# Patient Record
Sex: MALE | Race: WHITE | NOT HISPANIC OR LATINO | Employment: OTHER | URBAN - METROPOLITAN AREA
[De-identification: names, ages, dates, MRNs, and addresses within clinical notes are randomized per-mention and may not be internally consistent; named-entity substitution may affect disease eponyms.]

---

## 2018-06-28 NOTE — PROCEDURE NOTE: SURGICAL
Prior to commencing surgery patient identification, surgical procedure, site, and side were confirmed by Dr. Tawana Neville. Following topical proparacaine anesthesia, the patient was positioned at the YAG laser, a contact lens coupled to the cornea with methylcellulose and an axial posterior capsulotomy performed without complication using 2.9 Mj x 18. Excess methylcellulose was washed from the eye, one drop of Alphagan was instilled and the patient returned to the holding area having tolerated the procedure well and without complication. <br />[id number]

## 2019-06-05 ENCOUNTER — NEW PATIENT COMPREHENSIVE (OUTPATIENT)
Dept: URBAN - METROPOLITAN AREA CLINIC 36 | Facility: CLINIC | Age: 74
End: 2019-06-05

## 2019-06-05 DIAGNOSIS — H52.7: ICD-10-CM

## 2019-06-05 DIAGNOSIS — H33.302: ICD-10-CM

## 2019-06-05 DIAGNOSIS — E11.9: ICD-10-CM

## 2019-06-05 DIAGNOSIS — H34.8320: ICD-10-CM

## 2019-06-05 DIAGNOSIS — H40.1134: ICD-10-CM

## 2019-06-05 PROCEDURE — 92004 COMPRE OPH EXAM NEW PT 1/>: CPT

## 2019-06-05 PROCEDURE — 92015 DETERMINE REFRACTIVE STATE: CPT

## 2019-06-05 ASSESSMENT — VISUAL ACUITY
OD_PH: 20/20
OS_CC: J1
OS_SC: 20/100-1
OS_SC: <J10
OD_SC: <J2
OD_CC: J1+
OD_SC: 20/40+1

## 2019-06-05 ASSESSMENT — TONOMETRY
OD_IOP_MMHG: 13
OS_IOP_MMHG: 13

## 2019-06-12 ENCOUNTER — NEW PATIENT COMPREHENSIVE (OUTPATIENT)
Dept: URBAN - METROPOLITAN AREA CLINIC 36 | Facility: CLINIC | Age: 74
End: 2019-06-12

## 2019-06-12 DIAGNOSIS — H44.19: ICD-10-CM

## 2019-06-12 DIAGNOSIS — E11.9: ICD-10-CM

## 2019-06-12 DIAGNOSIS — H33.301: ICD-10-CM

## 2019-06-12 DIAGNOSIS — H34.8320: ICD-10-CM

## 2019-06-12 PROCEDURE — 92014 COMPRE OPH EXAM EST PT 1/>: CPT

## 2019-06-12 PROCEDURE — 92250 FUNDUS PHOTOGRAPHY W/I&R: CPT

## 2019-06-12 PROCEDURE — 9222550 BILAT EXTENDED OPHTHALMOSCOPY, FIRST

## 2019-06-12 PROCEDURE — 67028 INJECTION EYE DRUG: CPT

## 2019-06-12 PROCEDURE — 92235 FLUORESCEIN ANGRPH MLTIFRAME: CPT

## 2019-06-12 ASSESSMENT — TONOMETRY
OD_IOP_MMHG: 13
OS_IOP_MMHG: 13

## 2019-06-12 ASSESSMENT — VISUAL ACUITY
OD_SC: 20/25+1
OS_SC: 20/100

## 2019-07-24 ENCOUNTER — ESTABLISHED PATIENT (OUTPATIENT)
Dept: URBAN - METROPOLITAN AREA CLINIC 36 | Facility: CLINIC | Age: 74
End: 2019-07-24

## 2019-07-24 DIAGNOSIS — H40.1134: ICD-10-CM

## 2019-07-24 DIAGNOSIS — H34.8320: ICD-10-CM

## 2019-07-24 PROCEDURE — 92250 FUNDUS PHOTOGRAPHY W/I&R: CPT

## 2019-07-24 PROCEDURE — 67028 INJECTION EYE DRUG: CPT

## 2019-07-24 PROCEDURE — 92134 CPTRZ OPH DX IMG PST SGM RTA: CPT

## 2019-07-24 PROCEDURE — 92012 INTRM OPH EXAM EST PATIENT: CPT

## 2019-07-24 ASSESSMENT — VISUAL ACUITY
OS_SC: 20/200
OD_SC: 20/25+2

## 2019-07-24 ASSESSMENT — TONOMETRY
OS_IOP_MMHG: 12
OD_IOP_MMHG: 13

## 2019-07-26 ENCOUNTER — GLAUCOMA EVAL (OUTPATIENT)
Dept: URBAN - METROPOLITAN AREA CLINIC 36 | Facility: CLINIC | Age: 74
End: 2019-07-26

## 2019-07-26 VITALS
SYSTOLIC BLOOD PRESSURE: 155 MMHG | HEART RATE: 75 BPM | DIASTOLIC BLOOD PRESSURE: 99 MMHG | HEIGHT: 60 IN | RESPIRATION RATE: 16 BRPM

## 2019-07-26 DIAGNOSIS — H40.1134: ICD-10-CM

## 2019-07-26 PROCEDURE — 92004 COMPRE OPH EXAM NEW PT 1/>: CPT

## 2019-07-26 PROCEDURE — 76514 ECHO EXAM OF EYE THICKNESS: CPT

## 2019-07-26 PROCEDURE — 92250 FUNDUS PHOTOGRAPHY W/I&R: CPT

## 2019-07-26 PROCEDURE — 9222550 BILAT EXTENDED OPHTHALMOSCOPY, FIRST

## 2019-07-26 ASSESSMENT — VISUAL ACUITY
OS_SC: >J12
OD_SC: 20/20
OD_SC: J12
OS_SC: 20/100

## 2019-07-26 ASSESSMENT — PACHYMETRY
OD_CT_UM: 504
OS_CT_UM: 524

## 2019-07-26 ASSESSMENT — TONOMETRY
OD_IOP_MMHG: 12
OS_IOP_MMHG: 10

## 2019-08-21 ENCOUNTER — ESTABLISHED PATIENT (OUTPATIENT)
Dept: URBAN - METROPOLITAN AREA CLINIC 36 | Facility: CLINIC | Age: 74
End: 2019-08-21

## 2019-08-21 DIAGNOSIS — H35.30: ICD-10-CM

## 2019-08-21 DIAGNOSIS — H34.8320: ICD-10-CM

## 2019-08-21 DIAGNOSIS — H33.301: ICD-10-CM

## 2019-08-21 DIAGNOSIS — H43.813: ICD-10-CM

## 2019-08-21 DIAGNOSIS — E11.9: ICD-10-CM

## 2019-08-21 PROCEDURE — 92250 FUNDUS PHOTOGRAPHY W/I&R: CPT

## 2019-08-21 PROCEDURE — 92012 INTRM OPH EXAM EST PATIENT: CPT

## 2019-08-21 PROCEDURE — 9222650 BILAT EXTENDED OPHTHALMOSCOPY, F/U

## 2019-08-21 PROCEDURE — 92235 FLUORESCEIN ANGRPH MLTIFRAME: CPT

## 2019-08-21 PROCEDURE — 67028 INJECTION EYE DRUG: CPT

## 2019-08-21 PROCEDURE — 92134 CPTRZ OPH DX IMG PST SGM RTA: CPT

## 2019-08-21 ASSESSMENT — TONOMETRY
OD_IOP_MMHG: 13
OS_IOP_MMHG: 14

## 2019-08-21 ASSESSMENT — VISUAL ACUITY
OS_SC: 20/40-2
OD_SC: 20/20-2

## 2019-09-24 ENCOUNTER — ESTABLISHED PATIENT (OUTPATIENT)
Dept: URBAN - METROPOLITAN AREA CLINIC 36 | Facility: CLINIC | Age: 74
End: 2019-09-24

## 2019-09-24 DIAGNOSIS — H34.8320: ICD-10-CM

## 2019-09-24 DIAGNOSIS — E11.9: ICD-10-CM

## 2019-09-24 DIAGNOSIS — H33.301: ICD-10-CM

## 2019-09-24 DIAGNOSIS — H43.813: ICD-10-CM

## 2019-09-24 DIAGNOSIS — H44.19: ICD-10-CM

## 2019-09-24 PROCEDURE — 92012 INTRM OPH EXAM EST PATIENT: CPT

## 2019-09-24 PROCEDURE — 67028 INJECTION EYE DRUG: CPT

## 2019-09-24 PROCEDURE — 92134 CPTRZ OPH DX IMG PST SGM RTA: CPT

## 2019-09-24 ASSESSMENT — TONOMETRY
OD_IOP_MMHG: 22
OS_IOP_MMHG: 14

## 2019-09-24 ASSESSMENT — VISUAL ACUITY
OS_SC: 20/60
OD_SC: 20/25

## 2019-10-22 ENCOUNTER — ESTABLISHED PATIENT (OUTPATIENT)
Dept: URBAN - METROPOLITAN AREA CLINIC 36 | Facility: CLINIC | Age: 74
End: 2019-10-22

## 2019-10-22 DIAGNOSIS — H34.8320: ICD-10-CM

## 2019-10-22 DIAGNOSIS — H35.30: ICD-10-CM

## 2019-10-22 DIAGNOSIS — H33.301: ICD-10-CM

## 2019-10-22 DIAGNOSIS — E11.9: ICD-10-CM

## 2019-10-22 DIAGNOSIS — H43.813: ICD-10-CM

## 2019-10-22 PROCEDURE — 92250 FUNDUS PHOTOGRAPHY W/I&R: CPT

## 2019-10-22 PROCEDURE — 92235 FLUORESCEIN ANGRPH MLTIFRAME: CPT

## 2019-10-22 PROCEDURE — 92012 INTRM OPH EXAM EST PATIENT: CPT

## 2019-10-22 PROCEDURE — 67028 INJECTION EYE DRUG: CPT

## 2019-10-22 PROCEDURE — 9222650 BILAT EXTENDED OPHTHALMOSCOPY, F/U

## 2019-10-22 PROCEDURE — 92134 CPTRZ OPH DX IMG PST SGM RTA: CPT

## 2019-10-22 ASSESSMENT — TONOMETRY
OD_IOP_MMHG: 20
OS_IOP_MMHG: 11
OD_IOP_MMHG: 16

## 2019-10-22 ASSESSMENT — VISUAL ACUITY
OS_SC: 20/50-1
OD_SC: 20/25+2

## 2019-11-19 ENCOUNTER — ESTABLISHED PATIENT (OUTPATIENT)
Dept: URBAN - METROPOLITAN AREA CLINIC 36 | Facility: CLINIC | Age: 74
End: 2019-11-19

## 2019-11-19 DIAGNOSIS — H43.813: ICD-10-CM

## 2019-11-19 DIAGNOSIS — H35.033: ICD-10-CM

## 2019-11-19 DIAGNOSIS — E11.9: ICD-10-CM

## 2019-11-19 DIAGNOSIS — H35.30: ICD-10-CM

## 2019-11-19 DIAGNOSIS — H34.8320: ICD-10-CM

## 2019-11-19 PROCEDURE — 92012 INTRM OPH EXAM EST PATIENT: CPT

## 2019-11-19 PROCEDURE — 67028 INJECTION EYE DRUG: CPT

## 2019-11-19 PROCEDURE — 92134 CPTRZ OPH DX IMG PST SGM RTA: CPT

## 2019-11-19 ASSESSMENT — TONOMETRY
OS_IOP_MMHG: 12
OD_IOP_MMHG: 13

## 2019-11-19 ASSESSMENT — VISUAL ACUITY
OD_SC: 20/20-1
OS_SC: 20/60

## 2019-12-17 ENCOUNTER — ESTABLISHED PATIENT (OUTPATIENT)
Dept: URBAN - METROPOLITAN AREA CLINIC 36 | Facility: CLINIC | Age: 74
End: 2019-12-17

## 2019-12-17 DIAGNOSIS — E11.9: ICD-10-CM

## 2019-12-17 DIAGNOSIS — H43.813: ICD-10-CM

## 2019-12-17 DIAGNOSIS — H35.033: ICD-10-CM

## 2019-12-17 DIAGNOSIS — H34.8320: ICD-10-CM

## 2019-12-17 DIAGNOSIS — H35.30: ICD-10-CM

## 2019-12-17 DIAGNOSIS — H33.301: ICD-10-CM

## 2019-12-17 PROCEDURE — 92012 INTRM OPH EXAM EST PATIENT: CPT

## 2019-12-17 PROCEDURE — 92273 FULL FIELD ERG W/I&R: CPT

## 2019-12-17 PROCEDURE — 92134 CPTRZ OPH DX IMG PST SGM RTA: CPT

## 2019-12-17 PROCEDURE — 92250 FUNDUS PHOTOGRAPHY W/I&R: CPT

## 2019-12-17 PROCEDURE — 67028 INJECTION EYE DRUG: CPT

## 2019-12-17 ASSESSMENT — VISUAL ACUITY
OS_SC: 20/60
OD_SC: 20/20

## 2019-12-17 ASSESSMENT — TONOMETRY
OD_IOP_MMHG: 11
OS_IOP_MMHG: 11

## 2020-01-17 ENCOUNTER — IOP CHECK (OUTPATIENT)
Dept: URBAN - METROPOLITAN AREA CLINIC 36 | Facility: CLINIC | Age: 75
End: 2020-01-17

## 2020-01-17 DIAGNOSIS — H34.8320: ICD-10-CM

## 2020-01-17 DIAGNOSIS — H40.1112: ICD-10-CM

## 2020-01-17 DIAGNOSIS — H40.1123: ICD-10-CM

## 2020-01-17 PROCEDURE — 92012 INTRM OPH EXAM EST PATIENT: CPT

## 2020-01-17 PROCEDURE — 92083 EXTENDED VISUAL FIELD XM: CPT

## 2020-01-17 ASSESSMENT — VISUAL ACUITY
OS_SC: 20/60
OD_SC: 20/25+2

## 2020-01-17 ASSESSMENT — TONOMETRY
OS_IOP_MMHG: 14
OD_IOP_MMHG: 13

## 2020-01-22 ENCOUNTER — ESTABLISHED PATIENT (OUTPATIENT)
Dept: URBAN - METROPOLITAN AREA CLINIC 36 | Facility: CLINIC | Age: 75
End: 2020-01-22

## 2020-01-22 DIAGNOSIS — H34.8320: ICD-10-CM

## 2020-01-22 DIAGNOSIS — H35.033: ICD-10-CM

## 2020-01-22 PROCEDURE — 92134 CPTRZ OPH DX IMG PST SGM RTA: CPT

## 2020-01-22 PROCEDURE — 92235 FLUORESCEIN ANGRPH MLTIFRAME: CPT

## 2020-01-22 PROCEDURE — 92012 INTRM OPH EXAM EST PATIENT: CPT

## 2020-01-22 PROCEDURE — 92202 OPSCPY EXTND ON/MAC DRAW: CPT

## 2020-01-22 PROCEDURE — 67028 INJECTION EYE DRUG: CPT

## 2020-01-22 PROCEDURE — 92250 FUNDUS PHOTOGRAPHY W/I&R: CPT

## 2020-01-22 ASSESSMENT — VISUAL ACUITY
OD_SC: 20/20
OS_SC: 20/60

## 2020-01-22 ASSESSMENT — TONOMETRY
OD_IOP_MMHG: 11
OS_IOP_MMHG: 08

## 2020-02-19 ENCOUNTER — ESTABLISHED PATIENT (OUTPATIENT)
Dept: URBAN - METROPOLITAN AREA CLINIC 36 | Facility: CLINIC | Age: 75
End: 2020-02-19

## 2020-02-19 DIAGNOSIS — H35.033: ICD-10-CM

## 2020-02-19 DIAGNOSIS — H34.8320: ICD-10-CM

## 2020-02-19 PROCEDURE — 92012 INTRM OPH EXAM EST PATIENT: CPT

## 2020-02-19 PROCEDURE — 67028 INJECTION EYE DRUG: CPT

## 2020-02-19 PROCEDURE — 92273 FULL FIELD ERG W/I&R: CPT

## 2020-02-19 PROCEDURE — 92134 CPTRZ OPH DX IMG PST SGM RTA: CPT

## 2020-02-19 PROCEDURE — 92201 OPSCPY EXTND RTA DRAW UNI/BI: CPT

## 2020-02-19 ASSESSMENT — TONOMETRY
OD_IOP_MMHG: 12
OS_IOP_MMHG: 11

## 2020-02-19 ASSESSMENT — VISUAL ACUITY
OD_SC: 20/20
OS_SC: 20/40-1

## 2020-03-18 ENCOUNTER — ESTABLISHED PATIENT (OUTPATIENT)
Dept: URBAN - METROPOLITAN AREA CLINIC 36 | Facility: CLINIC | Age: 75
End: 2020-03-18

## 2020-03-18 DIAGNOSIS — H34.8320: ICD-10-CM

## 2020-03-18 DIAGNOSIS — H35.033: ICD-10-CM

## 2020-03-18 DIAGNOSIS — H33.301: ICD-10-CM

## 2020-03-18 DIAGNOSIS — H43.813: ICD-10-CM

## 2020-03-18 DIAGNOSIS — E11.9: ICD-10-CM

## 2020-03-18 DIAGNOSIS — H35.30: ICD-10-CM

## 2020-03-18 PROCEDURE — 92235 FLUORESCEIN ANGRPH MLTIFRAME: CPT

## 2020-03-18 PROCEDURE — 92250 FUNDUS PHOTOGRAPHY W/I&R: CPT

## 2020-03-18 PROCEDURE — 92014 COMPRE OPH EXAM EST PT 1/>: CPT

## 2020-03-18 PROCEDURE — 67028 INJECTION EYE DRUG: CPT

## 2020-03-18 ASSESSMENT — VISUAL ACUITY
OD_SC: 20/20
OS_SC: 20/60

## 2020-03-18 ASSESSMENT — TONOMETRY
OD_IOP_MMHG: 10
OS_IOP_MMHG: 12

## 2020-05-19 ENCOUNTER — IOP CHECK (OUTPATIENT)
Dept: URBAN - METROPOLITAN AREA CLINIC 36 | Facility: CLINIC | Age: 75
End: 2020-05-19

## 2020-05-19 DIAGNOSIS — H40.1112: ICD-10-CM

## 2020-05-19 DIAGNOSIS — H40.1123: ICD-10-CM

## 2020-05-19 PROCEDURE — 92133 CPTRZD OPH DX IMG PST SGM ON: CPT

## 2020-05-19 PROCEDURE — 92012 INTRM OPH EXAM EST PATIENT: CPT

## 2020-05-19 ASSESSMENT — VISUAL ACUITY
OS_SC: 20/30-2
OD_SC: J4
OD_SC: 20/20

## 2020-05-19 ASSESSMENT — TONOMETRY
OS_IOP_MMHG: 12
OD_IOP_MMHG: 12

## 2020-06-02 ENCOUNTER — ESTABLISHED PATIENT (OUTPATIENT)
Dept: URBAN - METROPOLITAN AREA CLINIC 36 | Facility: CLINIC | Age: 75
End: 2020-06-02

## 2020-06-02 DIAGNOSIS — H43.813: ICD-10-CM

## 2020-06-02 DIAGNOSIS — H34.8320: ICD-10-CM

## 2020-06-02 DIAGNOSIS — H35.30: ICD-10-CM

## 2020-06-02 DIAGNOSIS — H33.301: ICD-10-CM

## 2020-06-02 DIAGNOSIS — H35.033: ICD-10-CM

## 2020-06-02 DIAGNOSIS — H44.19: ICD-10-CM

## 2020-06-02 DIAGNOSIS — E11.9: ICD-10-CM

## 2020-06-02 PROCEDURE — 92014 COMPRE OPH EXAM EST PT 1/>: CPT

## 2020-06-02 PROCEDURE — 92235 FLUORESCEIN ANGRPH MLTIFRAME: CPT

## 2020-06-02 PROCEDURE — 92273 FULL FIELD ERG W/I&R: CPT

## 2020-06-02 PROCEDURE — 67028 INJECTION EYE DRUG: CPT

## 2020-06-02 PROCEDURE — 92250 FUNDUS PHOTOGRAPHY W/I&R: CPT

## 2020-06-02 ASSESSMENT — VISUAL ACUITY
OS_SC: 20/50-2
OD_SC: 20/20-1

## 2020-06-30 ENCOUNTER — ESTABLISHED PATIENT (OUTPATIENT)
Dept: URBAN - METROPOLITAN AREA CLINIC 36 | Facility: CLINIC | Age: 75
End: 2020-06-30

## 2020-06-30 DIAGNOSIS — H34.8320: ICD-10-CM

## 2020-06-30 DIAGNOSIS — H35.033: ICD-10-CM

## 2020-06-30 PROCEDURE — 92201 OPSCPY EXTND RTA DRAW UNI/BI: CPT

## 2020-06-30 PROCEDURE — 92134 CPTRZ OPH DX IMG PST SGM RTA: CPT

## 2020-06-30 PROCEDURE — 67028 INJECTION EYE DRUG: CPT

## 2020-06-30 PROCEDURE — 92012 INTRM OPH EXAM EST PATIENT: CPT

## 2020-06-30 ASSESSMENT — VISUAL ACUITY
OD_SC: 20/25
OS_SC: 20/40-2

## 2020-07-28 ENCOUNTER — ESTABLISHED PATIENT (OUTPATIENT)
Dept: URBAN - METROPOLITAN AREA CLINIC 36 | Facility: CLINIC | Age: 75
End: 2020-07-28

## 2020-07-28 DIAGNOSIS — H33.301: ICD-10-CM

## 2020-07-28 DIAGNOSIS — H44.19: ICD-10-CM

## 2020-07-28 DIAGNOSIS — H35.033: ICD-10-CM

## 2020-07-28 DIAGNOSIS — H43.813: ICD-10-CM

## 2020-07-28 DIAGNOSIS — H35.30: ICD-10-CM

## 2020-07-28 DIAGNOSIS — H34.8320: ICD-10-CM

## 2020-07-28 DIAGNOSIS — E11.9: ICD-10-CM

## 2020-07-28 PROCEDURE — 92202 OPSCPY EXTND ON/MAC DRAW: CPT

## 2020-07-28 PROCEDURE — 92012 INTRM OPH EXAM EST PATIENT: CPT

## 2020-07-28 PROCEDURE — 67028 INJECTION EYE DRUG: CPT

## 2020-07-28 PROCEDURE — 92134 CPTRZ OPH DX IMG PST SGM RTA: CPT

## 2020-07-28 ASSESSMENT — TONOMETRY
OD_IOP_MMHG: 12
OS_IOP_MMHG: 11

## 2020-07-28 ASSESSMENT — VISUAL ACUITY
OS_SC: 20/40-2
OD_SC: 20/20-2

## 2020-08-25 ENCOUNTER — ESTABLISHED PATIENT (OUTPATIENT)
Dept: URBAN - METROPOLITAN AREA CLINIC 36 | Facility: CLINIC | Age: 75
End: 2020-08-25

## 2020-08-25 DIAGNOSIS — H35.372: ICD-10-CM

## 2020-08-25 DIAGNOSIS — H35.30: ICD-10-CM

## 2020-08-25 DIAGNOSIS — H35.033: ICD-10-CM

## 2020-08-25 DIAGNOSIS — E11.9: ICD-10-CM

## 2020-08-25 DIAGNOSIS — H34.8320: ICD-10-CM

## 2020-08-25 DIAGNOSIS — H33.301: ICD-10-CM

## 2020-08-25 DIAGNOSIS — H43.813: ICD-10-CM

## 2020-08-25 PROCEDURE — 92250 FUNDUS PHOTOGRAPHY W/I&R: CPT

## 2020-08-25 PROCEDURE — 92012 INTRM OPH EXAM EST PATIENT: CPT

## 2020-08-25 PROCEDURE — 92235 FLUORESCEIN ANGRPH MLTIFRAME: CPT

## 2020-08-25 PROCEDURE — 92273 FULL FIELD ERG W/I&R: CPT

## 2020-08-25 PROCEDURE — 67028 INJECTION EYE DRUG: CPT

## 2020-08-25 ASSESSMENT — TONOMETRY
OD_IOP_MMHG: 19
OS_IOP_MMHG: 18

## 2020-08-25 ASSESSMENT — VISUAL ACUITY
OD_SC: 20/20-1
OS_SC: 20/100

## 2020-09-08 ENCOUNTER — ESTABLISHED PATIENT (OUTPATIENT)
Dept: URBAN - METROPOLITAN AREA CLINIC 36 | Facility: CLINIC | Age: 75
End: 2020-09-08

## 2020-09-08 DIAGNOSIS — H40.1123: ICD-10-CM

## 2020-09-08 DIAGNOSIS — H40.1112: ICD-10-CM

## 2020-09-08 PROCEDURE — 92012 INTRM OPH EXAM EST PATIENT: CPT

## 2020-09-08 ASSESSMENT — TONOMETRY
OD_IOP_MMHG: 12
OS_IOP_MMHG: 12

## 2020-09-08 ASSESSMENT — VISUAL ACUITY
OD_SC: 20/25-1
OS_SC: 20/60

## 2020-09-22 ENCOUNTER — ESTABLISHED PATIENT (OUTPATIENT)
Dept: URBAN - METROPOLITAN AREA CLINIC 36 | Facility: CLINIC | Age: 75
End: 2020-09-22

## 2020-09-22 DIAGNOSIS — H35.033: ICD-10-CM

## 2020-09-22 DIAGNOSIS — H34.8320: ICD-10-CM

## 2020-09-22 DIAGNOSIS — H44.19: ICD-10-CM

## 2020-09-22 DIAGNOSIS — H35.30: ICD-10-CM

## 2020-09-22 DIAGNOSIS — H43.813: ICD-10-CM

## 2020-09-22 DIAGNOSIS — E11.9: ICD-10-CM

## 2020-09-22 DIAGNOSIS — H33.301: ICD-10-CM

## 2020-09-22 DIAGNOSIS — H35.372: ICD-10-CM

## 2020-09-22 PROCEDURE — 67028 INJECTION EYE DRUG: CPT

## 2020-09-22 PROCEDURE — 92012 INTRM OPH EXAM EST PATIENT: CPT

## 2020-09-22 PROCEDURE — 92134 CPTRZ OPH DX IMG PST SGM RTA: CPT

## 2020-09-22 PROCEDURE — 92202 OPSCPY EXTND ON/MAC DRAW: CPT

## 2020-09-22 ASSESSMENT — VISUAL ACUITY
OD_SC: 20/20
OS_SC: 20/50+2

## 2020-09-22 ASSESSMENT — TONOMETRY
OD_IOP_MMHG: 09
OS_IOP_MMHG: 07

## 2020-10-20 ENCOUNTER — ESTABLISHED PATIENT (OUTPATIENT)
Dept: URBAN - METROPOLITAN AREA CLINIC 36 | Facility: CLINIC | Age: 75
End: 2020-10-20

## 2020-10-20 DIAGNOSIS — E11.9: ICD-10-CM

## 2020-10-20 DIAGNOSIS — H33.301: ICD-10-CM

## 2020-10-20 DIAGNOSIS — H43.813: ICD-10-CM

## 2020-10-20 DIAGNOSIS — H35.30: ICD-10-CM

## 2020-10-20 DIAGNOSIS — H35.372: ICD-10-CM

## 2020-10-20 DIAGNOSIS — H35.033: ICD-10-CM

## 2020-10-20 DIAGNOSIS — H34.8320: ICD-10-CM

## 2020-10-20 PROCEDURE — 92012 INTRM OPH EXAM EST PATIENT: CPT

## 2020-10-20 PROCEDURE — 67028 INJECTION EYE DRUG: CPT

## 2020-10-20 PROCEDURE — 92235 FLUORESCEIN ANGRPH MLTIFRAME: CPT

## 2020-10-20 PROCEDURE — 92273 FULL FIELD ERG W/I&R: CPT

## 2020-10-20 PROCEDURE — 92250 FUNDUS PHOTOGRAPHY W/I&R: CPT

## 2020-10-20 ASSESSMENT — TONOMETRY
OS_IOP_MMHG: 11
OD_IOP_MMHG: 12

## 2020-10-20 ASSESSMENT — VISUAL ACUITY
OS_SC: 20/40-2
OD_SC: 20/20

## 2020-11-17 ENCOUNTER — ESTABLISHED PATIENT (OUTPATIENT)
Dept: URBAN - METROPOLITAN AREA CLINIC 36 | Facility: CLINIC | Age: 75
End: 2020-11-17

## 2020-11-17 DIAGNOSIS — H35.033: ICD-10-CM

## 2020-11-17 DIAGNOSIS — H35.372: ICD-10-CM

## 2020-11-17 DIAGNOSIS — H44.19: ICD-10-CM

## 2020-11-17 DIAGNOSIS — H43.813: ICD-10-CM

## 2020-11-17 DIAGNOSIS — E11.9: ICD-10-CM

## 2020-11-17 DIAGNOSIS — H34.8320: ICD-10-CM

## 2020-11-17 DIAGNOSIS — H33.301: ICD-10-CM

## 2020-11-17 PROCEDURE — 67028 INJECTION EYE DRUG: CPT

## 2020-11-17 PROCEDURE — 92012 INTRM OPH EXAM EST PATIENT: CPT

## 2020-11-17 PROCEDURE — 92202 OPSCPY EXTND ON/MAC DRAW: CPT

## 2020-11-17 PROCEDURE — 92250 FUNDUS PHOTOGRAPHY W/I&R: CPT

## 2020-11-17 PROCEDURE — 92235 FLUORESCEIN ANGRPH MLTIFRAME: CPT

## 2020-11-17 PROCEDURE — 92134 CPTRZ OPH DX IMG PST SGM RTA: CPT

## 2020-11-17 ASSESSMENT — TONOMETRY
OS_IOP_MMHG: 11
OD_IOP_MMHG: 13

## 2020-11-17 ASSESSMENT — VISUAL ACUITY
OD_SC: 20/20
OS_SC: 20/30-1

## 2020-12-15 ENCOUNTER — ESTABLISHED PATIENT (OUTPATIENT)
Dept: URBAN - METROPOLITAN AREA CLINIC 36 | Facility: CLINIC | Age: 75
End: 2020-12-15

## 2020-12-15 DIAGNOSIS — H35.372: ICD-10-CM

## 2020-12-15 DIAGNOSIS — H43.813: ICD-10-CM

## 2020-12-15 DIAGNOSIS — H34.8320: ICD-10-CM

## 2020-12-15 DIAGNOSIS — H44.19: ICD-10-CM

## 2020-12-15 DIAGNOSIS — H33.301: ICD-10-CM

## 2020-12-15 DIAGNOSIS — H35.30: ICD-10-CM

## 2020-12-15 DIAGNOSIS — H35.033: ICD-10-CM

## 2020-12-15 DIAGNOSIS — E11.9: ICD-10-CM

## 2020-12-15 PROCEDURE — 67028 INJECTION EYE DRUG: CPT

## 2020-12-15 PROCEDURE — 92134 CPTRZ OPH DX IMG PST SGM RTA: CPT

## 2020-12-15 PROCEDURE — 92273 FULL FIELD ERG W/I&R: CPT

## 2020-12-15 PROCEDURE — 92202 OPSCPY EXTND ON/MAC DRAW: CPT

## 2020-12-15 PROCEDURE — 92012 INTRM OPH EXAM EST PATIENT: CPT

## 2020-12-15 ASSESSMENT — VISUAL ACUITY
OS_SC: 20/60
OD_SC: 20/20-2

## 2020-12-15 ASSESSMENT — TONOMETRY
OS_IOP_MMHG: 11
OD_IOP_MMHG: 13

## 2021-01-12 ENCOUNTER — ESTABLISHED PATIENT (OUTPATIENT)
Dept: URBAN - METROPOLITAN AREA CLINIC 36 | Facility: CLINIC | Age: 76
End: 2021-01-12

## 2021-01-12 DIAGNOSIS — H43.813: ICD-10-CM

## 2021-01-12 DIAGNOSIS — E11.9: ICD-10-CM

## 2021-01-12 DIAGNOSIS — H35.372: ICD-10-CM

## 2021-01-12 DIAGNOSIS — H35.033: ICD-10-CM

## 2021-01-12 DIAGNOSIS — H35.30: ICD-10-CM

## 2021-01-12 DIAGNOSIS — H33.301: ICD-10-CM

## 2021-01-12 DIAGNOSIS — H34.8320: ICD-10-CM

## 2021-01-12 PROCEDURE — 92250 FUNDUS PHOTOGRAPHY W/I&R: CPT

## 2021-01-12 PROCEDURE — 92235 FLUORESCEIN ANGRPH MLTIFRAME: CPT

## 2021-01-12 PROCEDURE — 99213 OFFICE O/P EST LOW 20 MIN: CPT

## 2021-01-12 PROCEDURE — 67028 INJECTION EYE DRUG: CPT

## 2021-01-12 ASSESSMENT — VISUAL ACUITY
OD_SC: 20/20-1
OS_SC: 20/30-2

## 2021-01-12 ASSESSMENT — TONOMETRY
OS_IOP_MMHG: 12
OD_IOP_MMHG: 15

## 2021-01-19 ENCOUNTER — ESTABLISHED COMPREHENSIVE EXAM (OUTPATIENT)
Dept: URBAN - METROPOLITAN AREA CLINIC 36 | Facility: CLINIC | Age: 76
End: 2021-01-19

## 2021-01-19 DIAGNOSIS — E11.9: ICD-10-CM

## 2021-01-19 DIAGNOSIS — H40.1131: ICD-10-CM

## 2021-01-19 DIAGNOSIS — H40.1112: ICD-10-CM

## 2021-01-19 DIAGNOSIS — H40.1123: ICD-10-CM

## 2021-01-19 PROCEDURE — 92133 CPTRZD OPH DX IMG PST SGM ON: CPT

## 2021-01-19 PROCEDURE — 92012 INTRM OPH EXAM EST PATIENT: CPT

## 2021-01-19 ASSESSMENT — VISUAL ACUITY
OD_SC: 20/25+2
OD_SC: J2
OS_SC: >J10
OS_SC: 20/30-2

## 2021-01-19 ASSESSMENT — TONOMETRY
OD_IOP_MMHG: 14
OS_IOP_MMHG: 11

## 2021-02-09 ENCOUNTER — ESTABLISHED PATIENT (OUTPATIENT)
Dept: URBAN - METROPOLITAN AREA CLINIC 36 | Facility: CLINIC | Age: 76
End: 2021-02-09

## 2021-02-09 DIAGNOSIS — H35.30: ICD-10-CM

## 2021-02-09 DIAGNOSIS — H33.301: ICD-10-CM

## 2021-02-09 DIAGNOSIS — H35.372: ICD-10-CM

## 2021-02-09 DIAGNOSIS — H43.813: ICD-10-CM

## 2021-02-09 DIAGNOSIS — H35.033: ICD-10-CM

## 2021-02-09 DIAGNOSIS — H40.1131: ICD-10-CM

## 2021-02-09 DIAGNOSIS — E11.9: ICD-10-CM

## 2021-02-09 DIAGNOSIS — H44.19: ICD-10-CM

## 2021-02-09 DIAGNOSIS — H34.8320: ICD-10-CM

## 2021-02-09 PROCEDURE — 99213 OFFICE O/P EST LOW 20 MIN: CPT

## 2021-02-09 PROCEDURE — 92202 OPSCPY EXTND ON/MAC DRAW: CPT

## 2021-02-09 PROCEDURE — 92134 CPTRZ OPH DX IMG PST SGM RTA: CPT

## 2021-02-09 PROCEDURE — 67028 INJECTION EYE DRUG: CPT

## 2021-02-09 PROCEDURE — 92273 FULL FIELD ERG W/I&R: CPT

## 2021-02-09 ASSESSMENT — VISUAL ACUITY
OD_SC: 20/20
OS_SC: 20/30-2

## 2021-02-09 ASSESSMENT — TONOMETRY
OS_IOP_MMHG: 10
OD_IOP_MMHG: 13

## 2021-03-09 ENCOUNTER — ESTABLISHED PATIENT (OUTPATIENT)
Dept: URBAN - METROPOLITAN AREA CLINIC 36 | Facility: CLINIC | Age: 76
End: 2021-03-09

## 2021-03-09 DIAGNOSIS — H35.033: ICD-10-CM

## 2021-03-09 DIAGNOSIS — H33.301: ICD-10-CM

## 2021-03-09 DIAGNOSIS — H43.813: ICD-10-CM

## 2021-03-09 DIAGNOSIS — H34.8320: ICD-10-CM

## 2021-03-09 DIAGNOSIS — H35.372: ICD-10-CM

## 2021-03-09 DIAGNOSIS — E11.9: ICD-10-CM

## 2021-03-09 DIAGNOSIS — H44.19: ICD-10-CM

## 2021-03-09 DIAGNOSIS — H35.30: ICD-10-CM

## 2021-03-09 PROCEDURE — 92250 FUNDUS PHOTOGRAPHY W/I&R: CPT

## 2021-03-09 PROCEDURE — 67028 INJECTION EYE DRUG: CPT

## 2021-03-09 PROCEDURE — 99213 OFFICE O/P EST LOW 20 MIN: CPT

## 2021-03-09 PROCEDURE — 92134 CPTRZ OPH DX IMG PST SGM RTA: CPT

## 2021-03-09 ASSESSMENT — TONOMETRY
OD_IOP_MMHG: 10
OS_IOP_MMHG: 10

## 2021-03-09 ASSESSMENT — VISUAL ACUITY
OD_SC: 20/20-1
OS_SC: 20/40-1

## 2021-04-06 ENCOUNTER — ESTABLISHED PATIENT (OUTPATIENT)
Dept: URBAN - METROPOLITAN AREA CLINIC 36 | Facility: CLINIC | Age: 76
End: 2021-04-06

## 2021-04-06 DIAGNOSIS — H53.9: ICD-10-CM

## 2021-04-06 DIAGNOSIS — H43.813: ICD-10-CM

## 2021-04-06 DIAGNOSIS — H40.1112: ICD-10-CM

## 2021-04-06 DIAGNOSIS — H35.033: ICD-10-CM

## 2021-04-06 DIAGNOSIS — H34.8320: ICD-10-CM

## 2021-04-06 DIAGNOSIS — H35.372: ICD-10-CM

## 2021-04-06 DIAGNOSIS — E11.9: ICD-10-CM

## 2021-04-06 DIAGNOSIS — H33.301: ICD-10-CM

## 2021-04-06 PROCEDURE — 92273 FULL FIELD ERG W/I&R: CPT

## 2021-04-06 PROCEDURE — 67028 INJECTION EYE DRUG: CPT

## 2021-04-06 PROCEDURE — 92235 FLUORESCEIN ANGRPH MLTIFRAME: CPT

## 2021-04-06 PROCEDURE — 99214 OFFICE O/P EST MOD 30 MIN: CPT

## 2021-04-06 PROCEDURE — 92250 FUNDUS PHOTOGRAPHY W/I&R: CPT

## 2021-04-06 ASSESSMENT — VISUAL ACUITY
OS_SC: 20/100
OD_SC: 20/20-1

## 2021-04-06 ASSESSMENT — TONOMETRY
OS_IOP_MMHG: 12
OD_IOP_MMHG: 12

## 2021-05-04 ENCOUNTER — ESTABLISHED PATIENT (OUTPATIENT)
Dept: URBAN - METROPOLITAN AREA CLINIC 36 | Facility: CLINIC | Age: 76
End: 2021-05-04

## 2021-05-04 DIAGNOSIS — H34.8320: ICD-10-CM

## 2021-05-04 DIAGNOSIS — H35.372: ICD-10-CM

## 2021-05-04 DIAGNOSIS — E11.9: ICD-10-CM

## 2021-05-04 DIAGNOSIS — H35.033: ICD-10-CM

## 2021-05-04 DIAGNOSIS — H43.813: ICD-10-CM

## 2021-05-04 PROCEDURE — 92202 OPSCPY EXTND ON/MAC DRAW: CPT

## 2021-05-04 PROCEDURE — 67028 INJECTION EYE DRUG: CPT

## 2021-05-04 PROCEDURE — 92134 CPTRZ OPH DX IMG PST SGM RTA: CPT

## 2021-05-04 PROCEDURE — 99213 OFFICE O/P EST LOW 20 MIN: CPT

## 2021-05-04 ASSESSMENT — VISUAL ACUITY
OS_SC: 20/40+2
OD_SC: 20/20-1

## 2021-05-04 ASSESSMENT — TONOMETRY
OS_IOP_MMHG: 11
OD_IOP_MMHG: 13

## 2021-06-01 ENCOUNTER — ESTABLISHED PATIENT (OUTPATIENT)
Dept: URBAN - METROPOLITAN AREA CLINIC 36 | Facility: CLINIC | Age: 76
End: 2021-06-01

## 2021-06-01 DIAGNOSIS — H40.1131: ICD-10-CM

## 2021-06-01 DIAGNOSIS — H40.1134: ICD-10-CM

## 2021-06-01 DIAGNOSIS — H35.372: ICD-10-CM

## 2021-06-01 DIAGNOSIS — H35.033: ICD-10-CM

## 2021-06-01 DIAGNOSIS — H43.813: ICD-10-CM

## 2021-06-01 DIAGNOSIS — E11.9: ICD-10-CM

## 2021-06-01 DIAGNOSIS — H34.8320: ICD-10-CM

## 2021-06-01 PROCEDURE — 99213 OFFICE O/P EST LOW 20 MIN: CPT

## 2021-06-01 PROCEDURE — 92273 FULL FIELD ERG W/I&R: CPT

## 2021-06-01 PROCEDURE — 67028 INJECTION EYE DRUG: CPT

## 2021-06-01 PROCEDURE — 92250 FUNDUS PHOTOGRAPHY W/I&R: CPT

## 2021-06-01 ASSESSMENT — VISUAL ACUITY
OS_SC: 20/40+1
OD_SC: 20/20

## 2021-06-01 ASSESSMENT — TONOMETRY
OD_IOP_MMHG: 17
OS_IOP_MMHG: 19

## 2021-06-29 ENCOUNTER — ESTABLISHED PATIENT (OUTPATIENT)
Dept: URBAN - METROPOLITAN AREA CLINIC 36 | Facility: CLINIC | Age: 76
End: 2021-06-29

## 2021-06-29 DIAGNOSIS — H34.8320: ICD-10-CM

## 2021-06-29 DIAGNOSIS — H43.813: ICD-10-CM

## 2021-06-29 DIAGNOSIS — H35.372: ICD-10-CM

## 2021-06-29 DIAGNOSIS — H35.033: ICD-10-CM

## 2021-06-29 DIAGNOSIS — E11.9: ICD-10-CM

## 2021-06-29 PROCEDURE — 92250 FUNDUS PHOTOGRAPHY W/I&R: CPT

## 2021-06-29 PROCEDURE — 67028 INJECTION EYE DRUG: CPT

## 2021-06-29 PROCEDURE — 99213 OFFICE O/P EST LOW 20 MIN: CPT

## 2021-06-29 PROCEDURE — 92235 FLUORESCEIN ANGRPH MLTIFRAME: CPT

## 2021-06-29 ASSESSMENT — TONOMETRY
OD_IOP_MMHG: 19
OS_IOP_MMHG: 14

## 2021-06-29 ASSESSMENT — VISUAL ACUITY
OS_SC: 20/50-2
OD_SC: 20/20

## 2021-07-22 ENCOUNTER — FOLLOW UP (OUTPATIENT)
Dept: URBAN - METROPOLITAN AREA CLINIC 36 | Facility: CLINIC | Age: 76
End: 2021-07-22

## 2021-07-22 DIAGNOSIS — H40.1123: ICD-10-CM

## 2021-07-22 DIAGNOSIS — H40.1112: ICD-10-CM

## 2021-07-22 PROCEDURE — 92012 INTRM OPH EXAM EST PATIENT: CPT

## 2021-07-22 ASSESSMENT — TONOMETRY
OS_IOP_MMHG: 14
OD_IOP_MMHG: 14

## 2021-07-22 ASSESSMENT — VISUAL ACUITY
OD_SC: 20/20
OS_SC: 20/40-2

## 2021-07-27 ENCOUNTER — ESTABLISHED PATIENT (OUTPATIENT)
Dept: URBAN - METROPOLITAN AREA CLINIC 36 | Facility: CLINIC | Age: 76
End: 2021-07-27

## 2021-07-27 DIAGNOSIS — H34.8320: ICD-10-CM

## 2021-07-27 DIAGNOSIS — H35.372: ICD-10-CM

## 2021-07-27 DIAGNOSIS — H35.033: ICD-10-CM

## 2021-07-27 DIAGNOSIS — H43.813: ICD-10-CM

## 2021-07-27 PROCEDURE — 92134 CPTRZ OPH DX IMG PST SGM RTA: CPT

## 2021-07-27 PROCEDURE — 99213 OFFICE O/P EST LOW 20 MIN: CPT

## 2021-07-27 PROCEDURE — 67028 INJECTION EYE DRUG: CPT

## 2021-07-27 PROCEDURE — 92202 OPSCPY EXTND ON/MAC DRAW: CPT

## 2021-07-27 PROCEDURE — 92273 FULL FIELD ERG W/I&R: CPT

## 2021-07-27 ASSESSMENT — VISUAL ACUITY
OS_SC: 20/40-2
OD_SC: 20/20-1

## 2021-07-27 ASSESSMENT — TONOMETRY
OD_IOP_MMHG: 11
OS_IOP_MMHG: 11

## 2021-08-24 ENCOUNTER — ESTABLISHED PATIENT (OUTPATIENT)
Dept: URBAN - METROPOLITAN AREA CLINIC 36 | Facility: CLINIC | Age: 76
End: 2021-08-24

## 2021-08-24 DIAGNOSIS — H34.8320: ICD-10-CM

## 2021-08-24 DIAGNOSIS — H43.813: ICD-10-CM

## 2021-08-24 DIAGNOSIS — H33.301: ICD-10-CM

## 2021-08-24 DIAGNOSIS — H35.372: ICD-10-CM

## 2021-08-24 DIAGNOSIS — H35.033: ICD-10-CM

## 2021-08-24 PROCEDURE — 67028 INJECTION EYE DRUG: CPT

## 2021-08-24 PROCEDURE — 99213 OFFICE O/P EST LOW 20 MIN: CPT

## 2021-08-24 PROCEDURE — 92250 FUNDUS PHOTOGRAPHY W/I&R: CPT

## 2021-08-24 ASSESSMENT — TONOMETRY
OD_IOP_MMHG: 17
OS_IOP_MMHG: 13

## 2021-08-24 ASSESSMENT — VISUAL ACUITY
OS_SC: 20/40
OD_SC: 20/20

## 2021-09-21 ENCOUNTER — ESTABLISHED PATIENT (OUTPATIENT)
Dept: URBAN - METROPOLITAN AREA CLINIC 36 | Facility: CLINIC | Age: 76
End: 2021-09-21

## 2021-09-21 DIAGNOSIS — H33.301: ICD-10-CM

## 2021-09-21 DIAGNOSIS — H34.8320: ICD-10-CM

## 2021-09-21 DIAGNOSIS — H35.372: ICD-10-CM

## 2021-09-21 DIAGNOSIS — H43.813: ICD-10-CM

## 2021-09-21 DIAGNOSIS — H35.033: ICD-10-CM

## 2021-09-21 PROCEDURE — 67028 INJECTION EYE DRUG: CPT

## 2021-09-21 PROCEDURE — 92273 FULL FIELD ERG W/I&R: CPT

## 2021-09-21 PROCEDURE — 99213 OFFICE O/P EST LOW 20 MIN: CPT

## 2021-09-21 PROCEDURE — 92250 FUNDUS PHOTOGRAPHY W/I&R: CPT

## 2021-09-21 PROCEDURE — 92235 FLUORESCEIN ANGRPH MLTIFRAME: CPT

## 2021-09-21 ASSESSMENT — VISUAL ACUITY
OD_SC: 20/25+2
OS_SC: 20/40-2

## 2021-09-21 ASSESSMENT — TONOMETRY
OS_IOP_MMHG: 12
OD_IOP_MMHG: 11

## 2021-10-19 ENCOUNTER — ESTABLISHED PATIENT (OUTPATIENT)
Dept: URBAN - METROPOLITAN AREA CLINIC 36 | Facility: CLINIC | Age: 76
End: 2021-10-19

## 2021-10-19 DIAGNOSIS — H43.813: ICD-10-CM

## 2021-10-19 DIAGNOSIS — H35.372: ICD-10-CM

## 2021-10-19 DIAGNOSIS — H34.8320: ICD-10-CM

## 2021-10-19 DIAGNOSIS — H40.1131: ICD-10-CM

## 2021-10-19 DIAGNOSIS — E11.9: ICD-10-CM

## 2021-10-19 DIAGNOSIS — H44.19: ICD-10-CM

## 2021-10-19 DIAGNOSIS — H35.033: ICD-10-CM

## 2021-10-19 PROCEDURE — 67028 INJECTION EYE DRUG: CPT

## 2021-10-19 PROCEDURE — 92134 CPTRZ OPH DX IMG PST SGM RTA: CPT

## 2021-10-19 PROCEDURE — 99213 OFFICE O/P EST LOW 20 MIN: CPT

## 2021-10-19 PROCEDURE — 92202 OPSCPY EXTND ON/MAC DRAW: CPT

## 2021-10-19 ASSESSMENT — TONOMETRY
OD_IOP_MMHG: 11
OS_IOP_MMHG: 12

## 2021-10-19 ASSESSMENT — VISUAL ACUITY
OD_SC: 20/20
OS_SC: 20/40-2

## 2021-11-16 ENCOUNTER — CLINICAL PROCEDURE AND DIAGNOSTIC TESTING ONLY (OUTPATIENT)
Dept: URBAN - METROPOLITAN AREA CLINIC 36 | Facility: CLINIC | Age: 76
End: 2021-11-16

## 2021-11-16 DIAGNOSIS — H34.8320: ICD-10-CM

## 2021-11-16 PROCEDURE — 67028 INJECTION EYE DRUG: CPT

## 2021-11-16 PROCEDURE — 92134 CPTRZ OPH DX IMG PST SGM RTA: CPT

## 2021-11-16 ASSESSMENT — VISUAL ACUITY
OS_SC: 20/30+1
OD_SC: 20/20

## 2021-11-16 ASSESSMENT — TONOMETRY
OS_IOP_MMHG: 10
OD_IOP_MMHG: 10

## 2021-12-14 ENCOUNTER — ESTABLISHED PATIENT (OUTPATIENT)
Dept: URBAN - METROPOLITAN AREA CLINIC 36 | Facility: CLINIC | Age: 76
End: 2021-12-14

## 2021-12-14 DIAGNOSIS — H43.813: ICD-10-CM

## 2021-12-14 DIAGNOSIS — E11.9: ICD-10-CM

## 2021-12-14 DIAGNOSIS — H35.372: ICD-10-CM

## 2021-12-14 DIAGNOSIS — H34.8320: ICD-10-CM

## 2021-12-14 DIAGNOSIS — H35.033: ICD-10-CM

## 2021-12-14 DIAGNOSIS — H33.301: ICD-10-CM

## 2021-12-14 PROCEDURE — 67028 INJECTION EYE DRUG: CPT

## 2021-12-14 PROCEDURE — 92273 FULL FIELD ERG W/I&R: CPT

## 2021-12-14 PROCEDURE — 92134 CPTRZ OPH DX IMG PST SGM RTA: CPT

## 2021-12-14 PROCEDURE — 99213 OFFICE O/P EST LOW 20 MIN: CPT

## 2021-12-14 ASSESSMENT — TONOMETRY
OS_IOP_MMHG: 12
OD_IOP_MMHG: 11

## 2021-12-14 ASSESSMENT — VISUAL ACUITY
OD_SC: 20/20
OS_SC: 20/50-1

## 2022-01-11 ENCOUNTER — ESTABLISHED PATIENT (OUTPATIENT)
Dept: URBAN - METROPOLITAN AREA CLINIC 36 | Facility: CLINIC | Age: 77
End: 2022-01-11

## 2022-01-11 DIAGNOSIS — H43.813: ICD-10-CM

## 2022-01-11 DIAGNOSIS — E11.9: ICD-10-CM

## 2022-01-11 DIAGNOSIS — H35.033: ICD-10-CM

## 2022-01-11 DIAGNOSIS — H34.8320: ICD-10-CM

## 2022-01-11 DIAGNOSIS — H33.301: ICD-10-CM

## 2022-01-11 DIAGNOSIS — H35.372: ICD-10-CM

## 2022-01-11 PROCEDURE — 92134 CPTRZ OPH DX IMG PST SGM RTA: CPT

## 2022-01-11 PROCEDURE — 67028 INJECTION EYE DRUG: CPT

## 2022-01-11 PROCEDURE — 99213 OFFICE O/P EST LOW 20 MIN: CPT

## 2022-01-11 ASSESSMENT — TONOMETRY
OS_IOP_MMHG: 12
OD_IOP_MMHG: 11

## 2022-01-11 ASSESSMENT — VISUAL ACUITY
OS_SC: 20/50+2
OD_SC: 20/20

## 2022-02-01 ENCOUNTER — COMPREHENSIVE EXAM (OUTPATIENT)
Dept: URBAN - METROPOLITAN AREA CLINIC 36 | Facility: CLINIC | Age: 77
End: 2022-02-01

## 2022-02-01 DIAGNOSIS — H40.1112: ICD-10-CM

## 2022-02-01 DIAGNOSIS — E11.9: ICD-10-CM

## 2022-02-01 DIAGNOSIS — Z96.1: ICD-10-CM

## 2022-02-01 DIAGNOSIS — H40.1123: ICD-10-CM

## 2022-02-01 PROCEDURE — 92012 INTRM OPH EXAM EST PATIENT: CPT

## 2022-02-01 ASSESSMENT — TONOMETRY
OD_IOP_MMHG: 14
OS_IOP_MMHG: 14

## 2022-02-01 ASSESSMENT — VISUAL ACUITY
OS_SC: J10
OD_SC: J1
OD_SC: 20/20
OS_SC: 20/50+2

## 2022-02-08 ENCOUNTER — ESTABLISHED PATIENT (OUTPATIENT)
Dept: URBAN - METROPOLITAN AREA CLINIC 36 | Facility: CLINIC | Age: 77
End: 2022-02-08

## 2022-02-08 DIAGNOSIS — H35.033: ICD-10-CM

## 2022-02-08 DIAGNOSIS — H34.8320: ICD-10-CM

## 2022-02-08 DIAGNOSIS — H40.1112: ICD-10-CM

## 2022-02-08 DIAGNOSIS — H40.1123: ICD-10-CM

## 2022-02-08 DIAGNOSIS — H35.30: ICD-10-CM

## 2022-02-08 PROCEDURE — 67028 INJECTION EYE DRUG: CPT

## 2022-02-08 PROCEDURE — 92273 FULL FIELD ERG W/I&R: CPT

## 2022-02-08 PROCEDURE — 92250 FUNDUS PHOTOGRAPHY W/I&R: CPT

## 2022-02-08 ASSESSMENT — VISUAL ACUITY
OD_SC: 20/20
OS_SC: 20/40-2

## 2022-02-08 ASSESSMENT — TONOMETRY
OS_IOP_MMHG: 8
OD_IOP_MMHG: 11

## 2022-03-08 ENCOUNTER — CLINIC PROCEDURE ONLY (OUTPATIENT)
Dept: URBAN - METROPOLITAN AREA CLINIC 36 | Facility: CLINIC | Age: 77
End: 2022-03-08

## 2022-03-08 DIAGNOSIS — H35.033: ICD-10-CM

## 2022-03-08 DIAGNOSIS — H34.8320: ICD-10-CM

## 2022-03-08 PROCEDURE — 92235 FLUORESCEIN ANGRPH MLTIFRAME: CPT

## 2022-03-08 PROCEDURE — 92134 CPTRZ OPH DX IMG PST SGM RTA: CPT

## 2022-03-08 PROCEDURE — 67210 TREATMENT OF RETINAL LESION: CPT

## 2022-03-08 ASSESSMENT — TONOMETRY
OS_IOP_MMHG: 12
OD_IOP_MMHG: 10

## 2022-03-08 ASSESSMENT — VISUAL ACUITY
OD_SC: 20/20-2
OS_SC: 20/40-1

## 2022-03-16 ENCOUNTER — CLINIC PROCEDURE ONLY (OUTPATIENT)
Dept: URBAN - METROPOLITAN AREA CLINIC 36 | Facility: CLINIC | Age: 77
End: 2022-03-16

## 2022-03-16 DIAGNOSIS — H34.8320: ICD-10-CM

## 2022-03-16 PROCEDURE — 67028 INJECTION EYE DRUG: CPT

## 2022-03-16 ASSESSMENT — TONOMETRY
OD_IOP_MMHG: 11
OS_IOP_MMHG: 9

## 2022-03-16 ASSESSMENT — VISUAL ACUITY
OS_SC: 20/40+1
OD_SC: 20/20-1

## 2022-03-21 NOTE — PATIENT DISCUSSION
Reassured pt. The OS is becoming more myopic due to cataract and it upsets the balance. Pt understands and wants to not make any changes at this time. He may use the the CL again PRN, update the CL PRN, glasses, or cataract surgery. He declines sx at this time.

## 2022-04-13 ENCOUNTER — CLINIC PROCEDURE ONLY (OUTPATIENT)
Dept: URBAN - METROPOLITAN AREA CLINIC 36 | Facility: CLINIC | Age: 77
End: 2022-04-13

## 2022-04-13 DIAGNOSIS — H34.8320: ICD-10-CM

## 2022-04-13 DIAGNOSIS — H35.372: ICD-10-CM

## 2022-04-13 PROCEDURE — 67028 INJECTION EYE DRUG: CPT

## 2022-04-13 PROCEDURE — 92250 FUNDUS PHOTOGRAPHY W/I&R: CPT

## 2022-04-13 ASSESSMENT — TONOMETRY
OD_IOP_MMHG: 09
OS_IOP_MMHG: 09

## 2022-04-13 ASSESSMENT — VISUAL ACUITY
OD_SC: 20/20
OS_SC: 20/40+1

## 2022-05-11 ENCOUNTER — ESTABLISHED PATIENT (OUTPATIENT)
Dept: URBAN - METROPOLITAN AREA CLINIC 36 | Facility: CLINIC | Age: 77
End: 2022-05-11

## 2022-05-11 DIAGNOSIS — E11.9: ICD-10-CM

## 2022-05-11 DIAGNOSIS — H35.033: ICD-10-CM

## 2022-05-11 DIAGNOSIS — H43.813: ICD-10-CM

## 2022-05-11 DIAGNOSIS — H34.8320: ICD-10-CM

## 2022-05-11 DIAGNOSIS — H35.372: ICD-10-CM

## 2022-05-11 PROCEDURE — 99213 OFFICE O/P EST LOW 20 MIN: CPT

## 2022-05-11 PROCEDURE — 92235 FLUORESCEIN ANGRPH MLTIFRAME: CPT

## 2022-05-11 PROCEDURE — 67028 INJECTION EYE DRUG: CPT

## 2022-05-11 PROCEDURE — 92250 FUNDUS PHOTOGRAPHY W/I&R: CPT

## 2022-05-11 ASSESSMENT — VISUAL ACUITY
OD_SC: 20/20-1
OS_SC: 20/50+2

## 2022-05-11 ASSESSMENT — TONOMETRY
OD_IOP_MMHG: 10
OS_IOP_MMHG: 10

## 2022-06-08 ENCOUNTER — CLINIC PROCEDURE ONLY (OUTPATIENT)
Dept: URBAN - METROPOLITAN AREA CLINIC 36 | Facility: CLINIC | Age: 77
End: 2022-06-08

## 2022-06-08 DIAGNOSIS — H35.30: ICD-10-CM

## 2022-06-08 DIAGNOSIS — H43.813: ICD-10-CM

## 2022-06-08 DIAGNOSIS — H34.8320: ICD-10-CM

## 2022-06-08 DIAGNOSIS — H35.033: ICD-10-CM

## 2022-06-08 PROCEDURE — 92250 FUNDUS PHOTOGRAPHY W/I&R: CPT

## 2022-06-08 PROCEDURE — 67028 INJECTION EYE DRUG: CPT

## 2022-06-08 ASSESSMENT — TONOMETRY
OD_IOP_MMHG: 10
OS_IOP_MMHG: 11

## 2022-06-08 ASSESSMENT — VISUAL ACUITY
OD_SC: 20/20
OS_SC: 20/50+2

## 2022-07-06 ENCOUNTER — ESTABLISHED PATIENT (OUTPATIENT)
Dept: URBAN - METROPOLITAN AREA CLINIC 36 | Facility: CLINIC | Age: 77
End: 2022-07-06

## 2022-07-06 DIAGNOSIS — E11.9: ICD-10-CM

## 2022-07-06 DIAGNOSIS — H35.372: ICD-10-CM

## 2022-07-06 DIAGNOSIS — H43.813: ICD-10-CM

## 2022-07-06 DIAGNOSIS — H35.033: ICD-10-CM

## 2022-07-06 DIAGNOSIS — H33.301: ICD-10-CM

## 2022-07-06 DIAGNOSIS — H34.8320: ICD-10-CM

## 2022-07-06 PROCEDURE — 92250 FUNDUS PHOTOGRAPHY W/I&R: CPT

## 2022-07-06 PROCEDURE — 99213 OFFICE O/P EST LOW 20 MIN: CPT

## 2022-07-06 PROCEDURE — 67028 INJECTION EYE DRUG: CPT

## 2022-07-06 PROCEDURE — 92273 FULL FIELD ERG W/I&R: CPT

## 2022-07-06 ASSESSMENT — TONOMETRY
OS_IOP_MMHG: 14
OD_IOP_MMHG: 14

## 2022-07-06 ASSESSMENT — VISUAL ACUITY
OS_SC: 20/70
OD_SC: 20/20

## 2022-08-03 ENCOUNTER — CLINIC PROCEDURE ONLY (OUTPATIENT)
Dept: URBAN - METROPOLITAN AREA CLINIC 36 | Facility: CLINIC | Age: 77
End: 2022-08-03

## 2022-08-03 DIAGNOSIS — H34.8320: ICD-10-CM

## 2022-08-03 DIAGNOSIS — H35.033: ICD-10-CM

## 2022-08-03 DIAGNOSIS — H35.30: ICD-10-CM

## 2022-08-03 PROCEDURE — 92250 FUNDUS PHOTOGRAPHY W/I&R: CPT

## 2022-08-03 PROCEDURE — 67028 INJECTION EYE DRUG: CPT

## 2022-08-03 ASSESSMENT — TONOMETRY
OD_IOP_MMHG: 9
OS_IOP_MMHG: 12

## 2022-08-03 ASSESSMENT — VISUAL ACUITY
OS_SC: 20/70
OD_SC: 20/20

## 2022-08-31 ENCOUNTER — ESTABLISHED PATIENT (OUTPATIENT)
Dept: URBAN - METROPOLITAN AREA CLINIC 36 | Facility: CLINIC | Age: 77
End: 2022-08-31

## 2022-08-31 DIAGNOSIS — H40.1134: ICD-10-CM

## 2022-08-31 DIAGNOSIS — H35.372: ICD-10-CM

## 2022-08-31 DIAGNOSIS — E11.9: ICD-10-CM

## 2022-08-31 DIAGNOSIS — H35.033: ICD-10-CM

## 2022-08-31 DIAGNOSIS — H43.813: ICD-10-CM

## 2022-08-31 DIAGNOSIS — H34.8320: ICD-10-CM

## 2022-08-31 DIAGNOSIS — H33.301: ICD-10-CM

## 2022-08-31 PROCEDURE — 92235 FLUORESCEIN ANGRPH MLTIFRAME: CPT

## 2022-08-31 PROCEDURE — 92250 FUNDUS PHOTOGRAPHY W/I&R: CPT

## 2022-08-31 PROCEDURE — 99213 OFFICE O/P EST LOW 20 MIN: CPT

## 2022-08-31 PROCEDURE — 67028 INJECTION EYE DRUG: CPT

## 2022-08-31 RX ORDER — BRIMONIDINE TARTRATE 2 MG/MG: 1 SOLUTION/ DROPS OPHTHALMIC TWICE A DAY

## 2022-08-31 ASSESSMENT — VISUAL ACUITY
OS_SC: 20/30+2
OD_SC: 20/20

## 2022-08-31 ASSESSMENT — TONOMETRY
OD_IOP_MMHG: 10
OS_IOP_MMHG: 13

## 2022-10-11 ENCOUNTER — CLINIC PROCEDURE ONLY (OUTPATIENT)
Dept: URBAN - METROPOLITAN AREA CLINIC 43 | Facility: CLINIC | Age: 77
End: 2022-10-11

## 2022-10-11 DIAGNOSIS — H40.1112: ICD-10-CM

## 2022-10-11 DIAGNOSIS — H40.1123: ICD-10-CM

## 2022-10-11 DIAGNOSIS — H35.30: ICD-10-CM

## 2022-10-11 DIAGNOSIS — H35.033: ICD-10-CM

## 2022-10-11 DIAGNOSIS — H34.8320: ICD-10-CM

## 2022-10-11 PROCEDURE — 92250 FUNDUS PHOTOGRAPHY W/I&R: CPT

## 2022-10-11 PROCEDURE — 67028 INJECTION EYE DRUG: CPT

## 2022-10-11 PROCEDURE — 92273 FULL FIELD ERG W/I&R: CPT

## 2022-10-11 ASSESSMENT — VISUAL ACUITY
OD_SC: 20/20
OS_SC: 20/25-1

## 2022-10-11 ASSESSMENT — TONOMETRY
OS_IOP_MMHG: 10
OD_IOP_MMHG: 9

## 2022-11-09 ENCOUNTER — ESTABLISHED PATIENT (OUTPATIENT)
Dept: URBAN - METROPOLITAN AREA CLINIC 36 | Facility: CLINIC | Age: 77
End: 2022-11-09

## 2022-11-09 DIAGNOSIS — H40.1134: ICD-10-CM

## 2022-11-09 DIAGNOSIS — E11.9: ICD-10-CM

## 2022-11-09 DIAGNOSIS — H35.033: ICD-10-CM

## 2022-11-09 DIAGNOSIS — H35.372: ICD-10-CM

## 2022-11-09 DIAGNOSIS — H40.1112: ICD-10-CM

## 2022-11-09 DIAGNOSIS — H35.30: ICD-10-CM

## 2022-11-09 DIAGNOSIS — H43.813: ICD-10-CM

## 2022-11-09 DIAGNOSIS — H40.1123: ICD-10-CM

## 2022-11-09 DIAGNOSIS — H34.8320: ICD-10-CM

## 2022-11-09 DIAGNOSIS — H33.301: ICD-10-CM

## 2022-11-09 PROCEDURE — 92250 FUNDUS PHOTOGRAPHY W/I&R: CPT

## 2022-11-09 PROCEDURE — 99213 OFFICE O/P EST LOW 20 MIN: CPT

## 2022-11-09 PROCEDURE — 67028 INJECTION EYE DRUG: CPT

## 2022-11-09 PROCEDURE — 92235 FLUORESCEIN ANGRPH MLTIFRAME: CPT

## 2022-11-09 ASSESSMENT — TONOMETRY
OD_IOP_MMHG: 9
OS_IOP_MMHG: 10

## 2022-11-09 ASSESSMENT — VISUAL ACUITY
OS_SC: 20/50+1
OD_SC: 20/20-1

## 2022-12-07 ENCOUNTER — CLINIC PROCEDURE ONLY (OUTPATIENT)
Dept: URBAN - METROPOLITAN AREA CLINIC 36 | Facility: CLINIC | Age: 77
End: 2022-12-07

## 2022-12-07 PROCEDURE — 92250 FUNDUS PHOTOGRAPHY W/I&R: CPT

## 2022-12-07 PROCEDURE — 67028 INJECTION EYE DRUG: CPT

## 2022-12-07 ASSESSMENT — VISUAL ACUITY
OD_SC: 20/20
OS_SC: 20/40

## 2022-12-07 ASSESSMENT — TONOMETRY
OS_IOP_MMHG: 12
OD_IOP_MMHG: 09

## 2023-01-04 ENCOUNTER — ESTABLISHED PATIENT (OUTPATIENT)
Dept: URBAN - METROPOLITAN AREA CLINIC 36 | Facility: CLINIC | Age: 78
End: 2023-01-04

## 2023-01-04 DIAGNOSIS — H35.30: ICD-10-CM

## 2023-01-04 DIAGNOSIS — H40.1134: ICD-10-CM

## 2023-01-04 DIAGNOSIS — H35.033: ICD-10-CM

## 2023-01-04 DIAGNOSIS — H33.301: ICD-10-CM

## 2023-01-04 DIAGNOSIS — E11.9: ICD-10-CM

## 2023-01-04 DIAGNOSIS — H43.813: ICD-10-CM

## 2023-01-04 DIAGNOSIS — H34.8320: ICD-10-CM

## 2023-01-04 DIAGNOSIS — H35.372: ICD-10-CM

## 2023-01-04 PROCEDURE — 92250 FUNDUS PHOTOGRAPHY W/I&R: CPT

## 2023-01-04 PROCEDURE — 67028 INJECTION EYE DRUG: CPT

## 2023-01-04 PROCEDURE — 99213 OFFICE O/P EST LOW 20 MIN: CPT

## 2023-01-04 PROCEDURE — 92273 FULL FIELD ERG W/I&R: CPT

## 2023-01-04 ASSESSMENT — TONOMETRY
OS_IOP_MMHG: 9
OD_IOP_MMHG: 9

## 2023-01-04 ASSESSMENT — VISUAL ACUITY
OS_SC: 20/40-2
OD_SC: 20/20-2

## 2023-04-26 ENCOUNTER — CLINIC PROCEDURE ONLY (OUTPATIENT)
Dept: URBAN - METROPOLITAN AREA CLINIC 36 | Facility: CLINIC | Age: 78
End: 2023-04-26

## 2023-04-26 DIAGNOSIS — H35.30: ICD-10-CM

## 2023-04-26 DIAGNOSIS — H35.033: ICD-10-CM

## 2023-04-26 DIAGNOSIS — H35.372: ICD-10-CM

## 2023-04-26 DIAGNOSIS — H40.1112: ICD-10-CM

## 2023-04-26 DIAGNOSIS — H43.813: ICD-10-CM

## 2023-04-26 DIAGNOSIS — H40.1123: ICD-10-CM

## 2023-04-26 DIAGNOSIS — H34.8320: ICD-10-CM

## 2023-04-26 PROCEDURE — 92250 FUNDUS PHOTOGRAPHY W/I&R: CPT

## 2023-04-26 PROCEDURE — 92273 FULL FIELD ERG W/I&R: CPT

## 2023-04-26 PROCEDURE — 67028 INJECTION EYE DRUG: CPT

## 2023-04-26 ASSESSMENT — VISUAL ACUITY
OS_SC: 20/40-1
OD_SC: 20/20

## 2023-04-26 ASSESSMENT — TONOMETRY
OS_IOP_MMHG: 11
OD_IOP_MMHG: 11

## 2023-05-24 ENCOUNTER — ESTABLISHED PATIENT (OUTPATIENT)
Dept: URBAN - METROPOLITAN AREA CLINIC 36 | Facility: CLINIC | Age: 78
End: 2023-05-24

## 2023-05-24 DIAGNOSIS — H43.813: ICD-10-CM

## 2023-05-24 DIAGNOSIS — H34.8320: ICD-10-CM

## 2023-05-24 DIAGNOSIS — H33.301: ICD-10-CM

## 2023-05-24 DIAGNOSIS — H35.033: ICD-10-CM

## 2023-05-24 DIAGNOSIS — E11.9: ICD-10-CM

## 2023-05-24 DIAGNOSIS — H35.09: ICD-10-CM

## 2023-05-24 DIAGNOSIS — H35.30: ICD-10-CM

## 2023-05-24 DIAGNOSIS — H35.372: ICD-10-CM

## 2023-05-24 DIAGNOSIS — H40.1134: ICD-10-CM

## 2023-05-24 PROCEDURE — 99213 OFFICE O/P EST LOW 20 MIN: CPT

## 2023-05-24 PROCEDURE — 92250 FUNDUS PHOTOGRAPHY W/I&R: CPT

## 2023-05-24 PROCEDURE — 67028 INJECTION EYE DRUG: CPT

## 2023-05-24 ASSESSMENT — TONOMETRY
OD_IOP_MMHG: 8
OS_IOP_MMHG: 9

## 2023-05-24 ASSESSMENT — VISUAL ACUITY
OD_SC: 20/20
OS_SC: 20/50-1

## 2023-07-19 ENCOUNTER — ESTABLISHED PATIENT (OUTPATIENT)
Dept: URBAN - METROPOLITAN AREA CLINIC 36 | Facility: CLINIC | Age: 78
End: 2023-07-19

## 2023-07-19 DIAGNOSIS — H34.8320: ICD-10-CM

## 2023-07-19 DIAGNOSIS — H35.033: ICD-10-CM

## 2023-07-19 DIAGNOSIS — H35.30: ICD-10-CM

## 2023-07-19 DIAGNOSIS — H35.09: ICD-10-CM

## 2023-07-19 DIAGNOSIS — H40.1131: ICD-10-CM

## 2023-07-19 DIAGNOSIS — H53.8: ICD-10-CM

## 2023-07-19 PROCEDURE — 92235 FLUORESCEIN ANGRPH MLTIFRAME: CPT

## 2023-07-19 PROCEDURE — 99214 OFFICE O/P EST MOD 30 MIN: CPT

## 2023-07-19 PROCEDURE — 92273 FULL FIELD ERG W/I&R: CPT

## 2023-07-19 PROCEDURE — 92250 FUNDUS PHOTOGRAPHY W/I&R: CPT

## 2023-07-19 PROCEDURE — 67028 INJECTION EYE DRUG: CPT

## 2023-07-19 ASSESSMENT — VISUAL ACUITY
OD_SC: 20/20
OS_SC: 20/400

## 2023-07-19 ASSESSMENT — TONOMETRY
OD_IOP_MMHG: 10
OS_IOP_MMHG: 09

## 2023-08-23 ENCOUNTER — PREPPED CHART (OUTPATIENT)
Dept: URBAN - METROPOLITAN AREA CLINIC 36 | Facility: CLINIC | Age: 78
End: 2023-08-23

## 2023-09-06 ENCOUNTER — CLINIC PROCEDURE ONLY (OUTPATIENT)
Dept: URBAN - METROPOLITAN AREA CLINIC 36 | Facility: CLINIC | Age: 78
End: 2023-09-06

## 2023-09-06 DIAGNOSIS — H34.8320: ICD-10-CM

## 2023-09-06 PROCEDURE — 92250 FUNDUS PHOTOGRAPHY W/I&R: CPT

## 2023-09-06 PROCEDURE — 67028 INJECTION EYE DRUG: CPT

## 2023-09-06 ASSESSMENT — TONOMETRY
OD_IOP_MMHG: 12
OS_IOP_MMHG: 13

## 2023-09-06 ASSESSMENT — VISUAL ACUITY
OD_SC: 20/20
OS_SC: 20/70-1

## 2023-10-04 ENCOUNTER — ESTABLISHED PATIENT (OUTPATIENT)
Dept: URBAN - METROPOLITAN AREA CLINIC 36 | Facility: CLINIC | Age: 78
End: 2023-10-04

## 2023-10-04 DIAGNOSIS — H34.8320: ICD-10-CM

## 2023-10-04 DIAGNOSIS — H43.813: ICD-10-CM

## 2023-10-04 DIAGNOSIS — H35.372: ICD-10-CM

## 2023-10-04 DIAGNOSIS — H33.301: ICD-10-CM

## 2023-10-04 DIAGNOSIS — H35.09: ICD-10-CM

## 2023-10-04 DIAGNOSIS — E11.9: ICD-10-CM

## 2023-10-04 DIAGNOSIS — H35.30: ICD-10-CM

## 2023-10-04 DIAGNOSIS — H35.033: ICD-10-CM

## 2023-10-04 PROCEDURE — 99213 OFFICE O/P EST LOW 20 MIN: CPT | Mod: 25

## 2023-10-04 PROCEDURE — 92250 FUNDUS PHOTOGRAPHY W/I&R: CPT

## 2023-10-04 PROCEDURE — 67028 INJECTION EYE DRUG: CPT

## 2023-10-04 RX ORDER — NEOMYCIN AND POLYMYXIN B SULFATES AND DEXAMETHASONE 1; 3.5; 1 MG/G; MG/G; [IU]/G: 1 OINTMENT OPHTHALMIC AS NEEDED

## 2023-10-04 ASSESSMENT — VISUAL ACUITY
OS_SC: 20/40-1
OD_SC: 20/20

## 2023-10-04 ASSESSMENT — TONOMETRY
OD_IOP_MMHG: 07
OS_IOP_MMHG: 07

## 2023-11-01 ENCOUNTER — CLINIC PROCEDURE ONLY (OUTPATIENT)
Dept: URBAN - METROPOLITAN AREA CLINIC 36 | Facility: CLINIC | Age: 78
End: 2023-11-01

## 2023-11-01 DIAGNOSIS — H34.8320: ICD-10-CM

## 2023-11-01 PROCEDURE — 92250 FUNDUS PHOTOGRAPHY W/I&R: CPT

## 2023-11-01 PROCEDURE — 67028 INJECTION EYE DRUG: CPT

## 2023-11-01 ASSESSMENT — TONOMETRY
OS_IOP_MMHG: 14
OD_IOP_MMHG: 12

## 2023-11-01 ASSESSMENT — VISUAL ACUITY
OS_SC: 20/40
OD_SC: 20/20-1

## 2023-11-29 ENCOUNTER — ESTABLISHED PATIENT (OUTPATIENT)
Dept: URBAN - METROPOLITAN AREA CLINIC 36 | Facility: CLINIC | Age: 78
End: 2023-11-29

## 2023-11-29 DIAGNOSIS — H34.8320: ICD-10-CM

## 2023-11-29 DIAGNOSIS — H35.09: ICD-10-CM

## 2023-11-29 DIAGNOSIS — H43.813: ICD-10-CM

## 2023-11-29 DIAGNOSIS — E11.9: ICD-10-CM

## 2023-11-29 DIAGNOSIS — H35.372: ICD-10-CM

## 2023-11-29 DIAGNOSIS — H35.033: ICD-10-CM

## 2023-11-29 DIAGNOSIS — H33.301: ICD-10-CM

## 2023-11-29 DIAGNOSIS — H35.30: ICD-10-CM

## 2023-11-29 PROCEDURE — 67028 INJECTION EYE DRUG: CPT

## 2023-11-29 PROCEDURE — 99213 OFFICE O/P EST LOW 20 MIN: CPT

## 2023-11-29 PROCEDURE — 92250 FUNDUS PHOTOGRAPHY W/I&R: CPT

## 2023-11-29 PROCEDURE — 92273 FULL FIELD ERG W/I&R: CPT

## 2023-11-29 ASSESSMENT — TONOMETRY
OS_IOP_MMHG: 13
OD_IOP_MMHG: 17

## 2023-11-29 ASSESSMENT — VISUAL ACUITY
OS_SC: 20/70
OD_SC: 20/20-1

## 2024-01-10 ENCOUNTER — CLINIC PROCEDURE ONLY (OUTPATIENT)
Dept: URBAN - METROPOLITAN AREA CLINIC 36 | Facility: CLINIC | Age: 79
End: 2024-01-10

## 2024-01-10 DIAGNOSIS — H34.8320: ICD-10-CM

## 2024-01-10 PROCEDURE — 67028 INJECTION EYE DRUG: CPT

## 2024-01-10 PROCEDURE — 92250 FUNDUS PHOTOGRAPHY W/I&R: CPT

## 2024-01-10 ASSESSMENT — VISUAL ACUITY
OS_SC: 20/40-2
OD_SC: 20/20

## 2024-01-10 ASSESSMENT — TONOMETRY
OS_IOP_MMHG: 8
OD_IOP_MMHG: 7

## 2024-02-21 ENCOUNTER — ESTABLISHED PATIENT (OUTPATIENT)
Dept: URBAN - METROPOLITAN AREA CLINIC 36 | Facility: CLINIC | Age: 79
End: 2024-02-21

## 2024-02-21 DIAGNOSIS — H34.8320: ICD-10-CM

## 2024-02-21 DIAGNOSIS — H35.033: ICD-10-CM

## 2024-02-21 DIAGNOSIS — H35.30: ICD-10-CM

## 2024-02-21 DIAGNOSIS — H43.813: ICD-10-CM

## 2024-02-21 DIAGNOSIS — H35.372: ICD-10-CM

## 2024-02-21 DIAGNOSIS — H35.09: ICD-10-CM

## 2024-02-21 DIAGNOSIS — H40.1131: ICD-10-CM

## 2024-02-21 PROCEDURE — 99213 OFFICE O/P EST LOW 20 MIN: CPT

## 2024-02-21 PROCEDURE — 67028 INJECTION EYE DRUG: CPT

## 2024-02-21 PROCEDURE — 92250 FUNDUS PHOTOGRAPHY W/I&R: CPT

## 2024-02-21 ASSESSMENT — VISUAL ACUITY
OD_SC: 20/20
OS_SC: 20/60-1

## 2024-02-21 ASSESSMENT — TONOMETRY
OS_IOP_MMHG: 12
OD_IOP_MMHG: 11

## 2024-03-20 ENCOUNTER — ESTABLISHED PATIENT (OUTPATIENT)
Dept: URBAN - METROPOLITAN AREA CLINIC 36 | Facility: CLINIC | Age: 79
End: 2024-03-20

## 2024-03-20 DIAGNOSIS — H33.301: ICD-10-CM

## 2024-03-20 DIAGNOSIS — H01.006: ICD-10-CM

## 2024-03-20 DIAGNOSIS — H01.003: ICD-10-CM

## 2024-03-20 DIAGNOSIS — H35.033: ICD-10-CM

## 2024-03-20 DIAGNOSIS — E11.9: ICD-10-CM

## 2024-03-20 DIAGNOSIS — H35.09: ICD-10-CM

## 2024-03-20 DIAGNOSIS — H35.372: ICD-10-CM

## 2024-03-20 DIAGNOSIS — H35.30: ICD-10-CM

## 2024-03-20 DIAGNOSIS — H34.8320: ICD-10-CM

## 2024-03-20 DIAGNOSIS — H43.813: ICD-10-CM

## 2024-03-20 PROCEDURE — 67028 INJECTION EYE DRUG: CPT

## 2024-03-20 PROCEDURE — 99214 OFFICE O/P EST MOD 30 MIN: CPT

## 2024-03-20 PROCEDURE — 92250 FUNDUS PHOTOGRAPHY W/I&R: CPT

## 2024-03-20 PROCEDURE — 92273 FULL FIELD ERG W/I&R: CPT

## 2024-03-20 ASSESSMENT — TONOMETRY
OD_IOP_MMHG: 06
OS_IOP_MMHG: 09

## 2024-03-20 ASSESSMENT — VISUAL ACUITY
OS_SC: 20/25-1
OD_SC: 20/20

## 2024-04-11 ENCOUNTER — COMPREHENSIVE EXAM (OUTPATIENT)
Dept: URBAN - METROPOLITAN AREA CLINIC 36 | Facility: CLINIC | Age: 79
End: 2024-04-11

## 2024-04-11 DIAGNOSIS — H33.302: ICD-10-CM

## 2024-04-11 DIAGNOSIS — H40.1112: ICD-10-CM

## 2024-04-11 DIAGNOSIS — Z96.1: ICD-10-CM

## 2024-04-11 DIAGNOSIS — H01.006: ICD-10-CM

## 2024-04-11 DIAGNOSIS — H35.09: ICD-10-CM

## 2024-04-11 DIAGNOSIS — E11.9: ICD-10-CM

## 2024-04-11 DIAGNOSIS — H35.372: ICD-10-CM

## 2024-04-11 DIAGNOSIS — H35.033: ICD-10-CM

## 2024-04-11 DIAGNOSIS — H35.30: ICD-10-CM

## 2024-04-11 DIAGNOSIS — H01.003: ICD-10-CM

## 2024-04-11 DIAGNOSIS — H43.813: ICD-10-CM

## 2024-04-11 DIAGNOSIS — H40.1123: ICD-10-CM

## 2024-04-11 PROCEDURE — 92014 COMPRE OPH EXAM EST PT 1/>: CPT

## 2024-04-11 PROCEDURE — 92133 CPTRZD OPH DX IMG PST SGM ON: CPT

## 2024-04-11 PROCEDURE — 92250 FUNDUS PHOTOGRAPHY W/I&R: CPT

## 2024-04-11 ASSESSMENT — VISUAL ACUITY
OD_SC: 20/25+2
OS_SC: 20/70

## 2024-04-11 ASSESSMENT — TONOMETRY
OD_IOP_MMHG: 10
OS_IOP_MMHG: 13

## 2024-04-17 ENCOUNTER — CLINIC PROCEDURE ONLY (OUTPATIENT)
Dept: URBAN - METROPOLITAN AREA CLINIC 36 | Facility: CLINIC | Age: 79
End: 2024-04-17

## 2024-04-17 DIAGNOSIS — H34.8320: ICD-10-CM

## 2024-04-17 PROCEDURE — 92250 FUNDUS PHOTOGRAPHY W/I&R: CPT

## 2024-04-17 PROCEDURE — 67028 INJECTION EYE DRUG: CPT

## 2024-04-17 ASSESSMENT — TONOMETRY
OS_IOP_MMHG: 13
OD_IOP_MMHG: 14

## 2024-04-17 ASSESSMENT — VISUAL ACUITY
OD_SC: 20/20-2
OS_SC: 20/40

## 2024-05-15 ENCOUNTER — ESTABLISHED PATIENT (OUTPATIENT)
Dept: URBAN - METROPOLITAN AREA CLINIC 36 | Facility: CLINIC | Age: 79
End: 2024-05-15

## 2024-05-15 DIAGNOSIS — H35.09: ICD-10-CM

## 2024-05-15 DIAGNOSIS — H33.301: ICD-10-CM

## 2024-05-15 DIAGNOSIS — E11.9: ICD-10-CM

## 2024-05-15 DIAGNOSIS — H35.30: ICD-10-CM

## 2024-05-15 DIAGNOSIS — H01.006: ICD-10-CM

## 2024-05-15 DIAGNOSIS — H43.813: ICD-10-CM

## 2024-05-15 DIAGNOSIS — H35.033: ICD-10-CM

## 2024-05-15 DIAGNOSIS — H35.372: ICD-10-CM

## 2024-05-15 DIAGNOSIS — H01.003: ICD-10-CM

## 2024-05-15 DIAGNOSIS — H34.8320: ICD-10-CM

## 2024-05-15 PROCEDURE — 67028 INJECTION EYE DRUG: CPT

## 2024-05-15 PROCEDURE — 99213 OFFICE O/P EST LOW 20 MIN: CPT | Mod: 25

## 2024-05-15 PROCEDURE — 92250 FUNDUS PHOTOGRAPHY W/I&R: CPT

## 2024-05-15 ASSESSMENT — VISUAL ACUITY
OD_SC: 20/20
OS_SC: 20/50-1

## 2024-05-15 ASSESSMENT — TONOMETRY
OD_IOP_MMHG: 10
OS_IOP_MMHG: 10

## 2024-05-16 NOTE — PATIENT DISCUSSION
Advised regular use of Amsler grid.
Amsler grid at home. MVI/AREDS discussed. Patient to call if any changes in vision or grid card.
Cataract symptoms i.e., glare, blur discussed. Pt to call if worsening vision or trouble with driving, TV, reading, ADL. UV precautions. Reviewed possibility of future cataract surgery.
Consider new brand of CL OS for DVO.
Consult with WADELAL for yag od,  Yag sheet given.
Discussed AREDS supplements, BP Control, UV protection and dark leafy green vegetables.
Discussed condition and exacerbating conditions/situations (e.g., dry/arid environments, overhead fans, air conditioners, side effect of medications).
Discussed lid hygiene, warm compress and eyelid wash.
Discussed the risks/benefits of laser capsulotomy. Laser recommended. Patient elects to proceed.
Good postoperative appearance.
Instructed to call immediately if any new distortion, blurring, decreased vision or eye pain.
Monitor cataract for progression.
Monitor.
No Retinal holes, Tears or Detachments.
PCO symptoms reviewed: blur, glare, difficulty with reading, driving, ADL.
Patient desires YAG Laser Capsulotomy.
Patient informed that he needs to clean CL more frequently.
Patient understands condition, prognosis and need for follow up care.
Proper hygiene and care of contact lenses discussed.
Reassess after yag OD.
Recommended artificial tears to use: 1 drop 4x a day in both eyes.
Retinal tear and detachment warning symptoms reviewed and patient instructed to call immediately if increasing floaters, flashes, or decreasing peripheral vision.
Include Z78.9 (Other Specified Conditions Influencing Health Status) As An Associated Diagnosis?: No
Spray Paint Text: The liquid nitrogen was applied to the skin utilizing a spray paint frosting technique.
Show Applicator Variable?: Yes
Number Of Freeze-Thaw Cycles: 2 freeze-thaw cycles
Medical Necessity Information: It is in your best interest to select a reason for this procedure from the list below. All of these items fulfill various CMS LCD requirements except the new and changing color options.
Detail Level: Detailed
Duration Of Freeze Thaw-Cycle (Seconds): 5-10
Post-Care Instructions: I reviewed with the patient in detail post-care instructions. Patient is to wear sunprotection, and avoid picking at any of the treated lesions. Pt may apply Vaseline to crusted or scabbing areas.
Consent: The patient's consent was obtained including but not limited to risks of crusting, scabbing, blistering, scarring, darker or lighter pigmentary change, recurrence, incomplete removal and infection.
Medical Necessity Clause: This procedure was medically necessary because the lesions that were treated were:

## 2024-06-12 ENCOUNTER — CLINIC PROCEDURE ONLY (OUTPATIENT)
Dept: URBAN - METROPOLITAN AREA CLINIC 36 | Facility: CLINIC | Age: 79
End: 2024-06-12

## 2024-06-12 DIAGNOSIS — H34.8320: ICD-10-CM

## 2024-06-12 PROCEDURE — 92250 FUNDUS PHOTOGRAPHY W/I&R: CPT

## 2024-06-12 PROCEDURE — 67028 INJECTION EYE DRUG: CPT

## 2024-06-12 ASSESSMENT — TONOMETRY
OD_IOP_MMHG: 10
OS_IOP_MMHG: 10

## 2024-06-12 ASSESSMENT — VISUAL ACUITY
OS_SC: 20/60-1
OD_SC: 20/20

## 2024-07-10 ENCOUNTER — COMPREHENSIVE EXAM (OUTPATIENT)
Dept: URBAN - METROPOLITAN AREA CLINIC 36 | Facility: CLINIC | Age: 79
End: 2024-07-10

## 2024-07-10 DIAGNOSIS — H35.30: ICD-10-CM

## 2024-07-10 DIAGNOSIS — H43.813: ICD-10-CM

## 2024-07-10 DIAGNOSIS — H35.09: ICD-10-CM

## 2024-07-10 DIAGNOSIS — H04.123: ICD-10-CM

## 2024-07-10 DIAGNOSIS — H34.8320: ICD-10-CM

## 2024-07-10 DIAGNOSIS — H35.033: ICD-10-CM

## 2024-07-10 DIAGNOSIS — E11.9: ICD-10-CM

## 2024-07-10 DIAGNOSIS — H35.372: ICD-10-CM

## 2024-07-10 PROCEDURE — 67028 INJECTION EYE DRUG: CPT

## 2024-07-10 PROCEDURE — 99213 OFFICE O/P EST LOW 20 MIN: CPT | Mod: 25

## 2024-07-10 PROCEDURE — 92250 FUNDUS PHOTOGRAPHY W/I&R: CPT

## 2024-07-10 ASSESSMENT — TONOMETRY
OS_IOP_MMHG: 12
OD_IOP_MMHG: 11

## 2024-07-10 ASSESSMENT — VISUAL ACUITY
OS_SC: 20/70-1
OD_SC: 20/30

## 2024-08-07 ENCOUNTER — CLINIC PROCEDURE ONLY (OUTPATIENT)
Dept: URBAN - METROPOLITAN AREA CLINIC 36 | Facility: CLINIC | Age: 79
End: 2024-08-07

## 2024-08-07 DIAGNOSIS — H34.8320: ICD-10-CM

## 2024-08-07 PROCEDURE — 67028 INJECTION EYE DRUG: CPT

## 2024-08-07 PROCEDURE — 92250 FUNDUS PHOTOGRAPHY W/I&R: CPT

## 2024-08-07 ASSESSMENT — VISUAL ACUITY
OD_SC: 20/20
OS_SC: 20/70-2

## 2024-08-07 ASSESSMENT — TONOMETRY
OD_IOP_MMHG: 08
OS_IOP_MMHG: 08

## 2024-09-18 ENCOUNTER — COMPREHENSIVE EXAM (OUTPATIENT)
Dept: URBAN - METROPOLITAN AREA CLINIC 36 | Facility: CLINIC | Age: 79
End: 2024-09-18

## 2024-09-18 DIAGNOSIS — H43.813: ICD-10-CM

## 2024-09-18 DIAGNOSIS — H34.8320: ICD-10-CM

## 2024-09-18 DIAGNOSIS — H35.30: ICD-10-CM

## 2024-09-18 DIAGNOSIS — H35.033: ICD-10-CM

## 2024-09-18 DIAGNOSIS — H01.006: ICD-10-CM

## 2024-09-18 DIAGNOSIS — H35.09: ICD-10-CM

## 2024-09-18 DIAGNOSIS — E11.9: ICD-10-CM

## 2024-09-18 DIAGNOSIS — H04.123: ICD-10-CM

## 2024-09-18 DIAGNOSIS — H35.372: ICD-10-CM

## 2024-09-18 DIAGNOSIS — H01.003: ICD-10-CM

## 2024-09-18 PROCEDURE — 99214 OFFICE O/P EST MOD 30 MIN: CPT | Mod: 25

## 2024-09-18 PROCEDURE — 92250 FUNDUS PHOTOGRAPHY W/I&R: CPT

## 2024-09-18 PROCEDURE — 67028 INJECTION EYE DRUG: CPT

## 2024-10-18 ENCOUNTER — PREPPED CHART (OUTPATIENT)
Dept: URBAN - METROPOLITAN AREA CLINIC 36 | Facility: CLINIC | Age: 79
End: 2024-10-18

## 2024-10-30 ENCOUNTER — CLINIC PROCEDURE ONLY (OUTPATIENT)
Dept: URBAN - METROPOLITAN AREA CLINIC 36 | Facility: CLINIC | Age: 79
End: 2024-10-30

## 2024-10-30 DIAGNOSIS — H34.8320: ICD-10-CM

## 2024-10-30 PROCEDURE — 92250 FUNDUS PHOTOGRAPHY W/I&R: CPT

## 2024-10-30 PROCEDURE — 67028 INJECTION EYE DRUG: CPT

## 2024-10-30 PROCEDURE — 92273 FULL FIELD ERG W/I&R: CPT

## 2024-12-11 ENCOUNTER — COMPREHENSIVE EXAM (OUTPATIENT)
Age: 79
End: 2024-12-11

## 2024-12-11 DIAGNOSIS — E11.9: ICD-10-CM

## 2024-12-11 DIAGNOSIS — H40.1123: ICD-10-CM

## 2024-12-11 DIAGNOSIS — H33.301: ICD-10-CM

## 2024-12-11 DIAGNOSIS — H35.033: ICD-10-CM

## 2024-12-11 DIAGNOSIS — H35.372: ICD-10-CM

## 2024-12-11 DIAGNOSIS — H04.123: ICD-10-CM

## 2024-12-11 DIAGNOSIS — H34.8320: ICD-10-CM

## 2024-12-11 DIAGNOSIS — H40.1112: ICD-10-CM

## 2024-12-11 PROCEDURE — 92250 FUNDUS PHOTOGRAPHY W/I&R: CPT

## 2024-12-11 PROCEDURE — 67028 INJECTION EYE DRUG: CPT

## 2024-12-11 PROCEDURE — J2777PFS VABYSMO PFS: Mod: JZ,LT

## 2024-12-11 PROCEDURE — 99213 OFFICE O/P EST LOW 20 MIN: CPT | Mod: 25

## 2025-01-22 ENCOUNTER — CLINIC PROCEDURE ONLY (OUTPATIENT)
Age: 80
End: 2025-01-22

## 2025-01-22 DIAGNOSIS — H34.8320: ICD-10-CM

## 2025-01-22 PROCEDURE — 67028 INJECTION EYE DRUG: CPT

## 2025-01-22 PROCEDURE — J2777PFS VABYSMO PFS: Mod: JZ,LT

## 2025-01-22 PROCEDURE — 92250 FUNDUS PHOTOGRAPHY W/I&R: CPT

## 2025-03-05 ENCOUNTER — COMPREHENSIVE EXAM (OUTPATIENT)
Age: 80
End: 2025-03-05

## 2025-03-05 DIAGNOSIS — H40.1112: ICD-10-CM

## 2025-03-05 DIAGNOSIS — H04.123: ICD-10-CM

## 2025-03-05 DIAGNOSIS — H35.033: ICD-10-CM

## 2025-03-05 DIAGNOSIS — H40.1123: ICD-10-CM

## 2025-03-05 DIAGNOSIS — H34.8320: ICD-10-CM

## 2025-03-05 DIAGNOSIS — E11.9: ICD-10-CM

## 2025-03-05 PROCEDURE — 99213 OFFICE O/P EST LOW 20 MIN: CPT | Mod: 25

## 2025-03-05 PROCEDURE — J2777PFS VABYSMO PFS: Mod: JZ,LT

## 2025-03-05 PROCEDURE — 92134 CPTRZ OPH DX IMG PST SGM RTA: CPT

## 2025-03-05 PROCEDURE — 67028 INJECTION EYE DRUG: CPT

## 2025-04-16 ENCOUNTER — CLINIC PROCEDURE ONLY (OUTPATIENT)
Age: 80
End: 2025-04-16

## 2025-04-16 DIAGNOSIS — H34.8320: ICD-10-CM

## 2025-04-16 PROCEDURE — J2777PFS VABYSMO PFS: Mod: JZ,LT

## 2025-04-16 PROCEDURE — 92250 FUNDUS PHOTOGRAPHY W/I&R: CPT

## 2025-04-16 PROCEDURE — 67028 INJECTION EYE DRUG: CPT

## 2025-05-28 ENCOUNTER — FOLLOW UP (OUTPATIENT)
Age: 80
End: 2025-05-28

## 2025-05-28 DIAGNOSIS — H35.09: ICD-10-CM

## 2025-05-28 DIAGNOSIS — H04.123: ICD-10-CM

## 2025-05-28 DIAGNOSIS — H35.30: ICD-10-CM

## 2025-05-28 DIAGNOSIS — H44.19: ICD-10-CM

## 2025-05-28 DIAGNOSIS — E11.9: ICD-10-CM

## 2025-05-28 DIAGNOSIS — H35.372: ICD-10-CM

## 2025-05-28 DIAGNOSIS — H35.033: ICD-10-CM

## 2025-05-28 DIAGNOSIS — H43.813: ICD-10-CM

## 2025-05-28 DIAGNOSIS — H33.301: ICD-10-CM

## 2025-05-28 DIAGNOSIS — H34.8320: ICD-10-CM

## 2025-05-28 PROCEDURE — 99213 OFFICE O/P EST LOW 20 MIN: CPT | Mod: 25

## 2025-05-28 PROCEDURE — 67028 INJECTION EYE DRUG: CPT

## 2025-05-28 PROCEDURE — 92273 FULL FIELD ERG W/I&R: CPT

## 2025-05-28 PROCEDURE — 92134 CPTRZ OPH DX IMG PST SGM RTA: CPT

## 2025-05-28 PROCEDURE — J2777PFS VABYSMO PFS: Mod: JZ,LT

## 2025-07-23 ENCOUNTER — CLINIC PROCEDURE ONLY (OUTPATIENT)
Age: 80
End: 2025-07-23

## 2025-07-23 DIAGNOSIS — E11.9: ICD-10-CM

## 2025-07-23 DIAGNOSIS — H34.8320: ICD-10-CM

## 2025-07-23 PROCEDURE — J2777PFS VABYSMO PFS: Mod: JZ,LT

## 2025-07-23 PROCEDURE — 92250 FUNDUS PHOTOGRAPHY W/I&R: CPT

## 2025-07-23 PROCEDURE — 67028 INJECTION EYE DRUG: CPT

## 2025-08-07 ENCOUNTER — APPOINTMENT (OUTPATIENT)
Dept: URBAN - METROPOLITAN AREA CLINIC 137 | Facility: CLINIC | Age: 80
Setting detail: DERMATOLOGY
End: 2025-08-07

## 2025-08-07 DIAGNOSIS — D49.2 NEOPLASM OF UNSPECIFIED BEHAVIOR OF BONE, SOFT TISSUE, AND SKIN: ICD-10-CM | Status: INADEQUATELY CONTROLLED

## 2025-08-07 DIAGNOSIS — Z85.820 PERSONAL HISTORY OF MALIGNANT MELANOMA OF SKIN: ICD-10-CM

## 2025-08-07 DIAGNOSIS — L57.8 OTHER SKIN CHANGES DUE TO CHRONIC EXPOSURE TO NONIONIZING RADIATION: ICD-10-CM

## 2025-08-07 DIAGNOSIS — Z85.828 PERSONAL HISTORY OF OTHER MALIGNANT NEOPLASM OF SKIN: ICD-10-CM

## 2025-08-07 PROCEDURE — ? COUNSELING

## 2025-08-07 PROCEDURE — ? OBSERVATION

## 2025-08-07 PROCEDURE — ?: Mod: 25

## 2025-08-07 PROCEDURE — ?

## 2025-08-07 PROCEDURE — ? BIOPSY BY SHAVE METHOD

## 2025-08-07 ASSESSMENT — LOCATION ZONE DERM
LOCATION ZONE: NOSE
LOCATION ZONE: FACE
LOCATION ZONE: SCALP

## 2025-08-07 ASSESSMENT — LOCATION SIMPLE DESCRIPTION DERM
LOCATION SIMPLE: LEFT OCCIPITAL SCALP
LOCATION SIMPLE: LEFT ZYGOMA
LOCATION SIMPLE: SCALP
LOCATION SIMPLE: NOSE
LOCATION SIMPLE: LEFT FOREHEAD

## 2025-08-07 ASSESSMENT — LOCATION DETAILED DESCRIPTION DERM
LOCATION DETAILED: LEFT CENTRAL ZYGOMA
LOCATION DETAILED: LEFT SUPERIOR MEDIAL FOREHEAD
LOCATION DETAILED: LEFT SUPERIOR PARIETAL SCALP
LOCATION DETAILED: NASAL SUPRATIP
LOCATION DETAILED: LEFT MEDIAL FOREHEAD
LOCATION DETAILED: LEFT SUPERIOR OCCIPITAL SCALP